# Patient Record
Sex: FEMALE | Race: BLACK OR AFRICAN AMERICAN | Employment: UNEMPLOYED | URBAN - METROPOLITAN AREA
[De-identification: names, ages, dates, MRNs, and addresses within clinical notes are randomized per-mention and may not be internally consistent; named-entity substitution may affect disease eponyms.]

---

## 2017-01-01 ENCOUNTER — APPOINTMENT (OUTPATIENT)
Dept: GENERAL RADIOLOGY | Age: 57
DRG: 023 | End: 2017-01-01
Attending: INTERNAL MEDICINE
Payer: SELF-PAY

## 2017-01-01 ENCOUNTER — APPOINTMENT (OUTPATIENT)
Dept: GENERAL RADIOLOGY | Age: 57
End: 2017-01-01
Attending: EMERGENCY MEDICINE
Payer: SELF-PAY

## 2017-01-01 ENCOUNTER — HOSPITAL ENCOUNTER (INPATIENT)
Age: 57
LOS: 3 days | DRG: 023 | End: 2017-07-03
Attending: EMERGENCY MEDICINE | Admitting: INTERNAL MEDICINE
Payer: SELF-PAY

## 2017-01-01 ENCOUNTER — APPOINTMENT (OUTPATIENT)
Dept: GENERAL RADIOLOGY | Age: 57
DRG: 023 | End: 2017-01-01
Attending: PSYCHIATRY & NEUROLOGY
Payer: SELF-PAY

## 2017-01-01 ENCOUNTER — APPOINTMENT (OUTPATIENT)
Dept: CT IMAGING | Age: 57
DRG: 023 | End: 2017-01-01
Attending: PSYCHIATRY & NEUROLOGY
Payer: SELF-PAY

## 2017-01-01 ENCOUNTER — ANESTHESIA EVENT (OUTPATIENT)
Dept: INTERVENTIONAL RADIOLOGY/VASCULAR | Age: 57
DRG: 023 | End: 2017-01-01
Payer: SELF-PAY

## 2017-01-01 ENCOUNTER — ANESTHESIA (OUTPATIENT)
Dept: INTERVENTIONAL RADIOLOGY/VASCULAR | Age: 57
DRG: 023 | End: 2017-01-01
Payer: SELF-PAY

## 2017-01-01 ENCOUNTER — TELEPHONE (OUTPATIENT)
Dept: NEUROLOGY | Age: 57
End: 2017-01-01

## 2017-01-01 ENCOUNTER — APPOINTMENT (OUTPATIENT)
Dept: CT IMAGING | Age: 57
End: 2017-01-01
Attending: EMERGENCY MEDICINE
Payer: SELF-PAY

## 2017-01-01 ENCOUNTER — APPOINTMENT (OUTPATIENT)
Dept: INTERVENTIONAL RADIOLOGY/VASCULAR | Age: 57
DRG: 023 | End: 2017-01-01
Attending: PSYCHIATRY & NEUROLOGY
Payer: SELF-PAY

## 2017-01-01 ENCOUNTER — HOSPITAL ENCOUNTER (EMERGENCY)
Age: 57
Discharge: ANOTHER HOSPITAL | End: 2017-06-30
Attending: EMERGENCY MEDICINE
Payer: SELF-PAY

## 2017-01-01 VITALS
HEIGHT: 65 IN | SYSTOLIC BLOOD PRESSURE: 97 MMHG | DIASTOLIC BLOOD PRESSURE: 69 MMHG | BODY MASS INDEX: 16.75 KG/M2 | WEIGHT: 100.53 LBS | HEART RATE: 81 BPM | OXYGEN SATURATION: 100 % | TEMPERATURE: 97.4 F

## 2017-01-01 VITALS
TEMPERATURE: 96.6 F | HEART RATE: 93 BPM | SYSTOLIC BLOOD PRESSURE: 207 MMHG | OXYGEN SATURATION: 100 % | DIASTOLIC BLOOD PRESSURE: 111 MMHG | RESPIRATION RATE: 18 BRPM

## 2017-01-01 DIAGNOSIS — I61.5 IVH (INTRAVENTRICULAR HEMORRHAGE) (HCC): Primary | ICD-10-CM

## 2017-01-01 DIAGNOSIS — G91.9 HYDROCEPHALUS (HCC): ICD-10-CM

## 2017-01-01 DIAGNOSIS — F14.10 COCAINE ABUSE (HCC): ICD-10-CM

## 2017-01-01 DIAGNOSIS — R56.9 SEIZURES (HCC): ICD-10-CM

## 2017-01-01 DIAGNOSIS — I61.0 THALAMIC HEMORRHAGE (HCC): Primary | ICD-10-CM

## 2017-01-01 DIAGNOSIS — G93.6 CEREBRAL EDEMA (HCC): ICD-10-CM

## 2017-01-01 DIAGNOSIS — I61.5 LEFT-SIDED NONTRAUMATIC INTRAVENTRICULAR INTRACEREBRAL HEMORRHAGE (HCC): ICD-10-CM

## 2017-01-01 DIAGNOSIS — E87.6 HYPOKALEMIA: ICD-10-CM

## 2017-01-01 DIAGNOSIS — R41.82 ALTERED MENTAL STATUS, UNSPECIFIED ALTERED MENTAL STATUS TYPE: ICD-10-CM

## 2017-01-01 LAB
ABO + RH BLD: NORMAL
ABO + RH BLD: NORMAL
ACETONE SERPL-MCNC: NEGATIVE % (ref 0–0.01)
ALBUMIN SERPL BCP-MCNC: 3 G/DL (ref 3.4–5)
ALBUMIN SERPL BCP-MCNC: 4.1 G/DL (ref 3.4–5)
ALBUMIN/GLOB SERPL: 0.8 {RATIO} (ref 0.8–1.7)
ALBUMIN/GLOB SERPL: 1 {RATIO} (ref 0.8–1.7)
ALP SERPL-CCNC: 133 U/L (ref 45–117)
ALP SERPL-CCNC: 85 U/L (ref 45–117)
ALT SERPL-CCNC: 25 U/L (ref 13–56)
ALT SERPL-CCNC: 39 U/L (ref 13–56)
AMMONIA PLAS-SCNC: 29 UMOL/L (ref 11–32)
AMPHET UR QL SCN: NEGATIVE
AMYLASE SERPL-CCNC: 108 U/L (ref 25–115)
ANION GAP BLD CALC-SCNC: 10 MMOL/L (ref 3–18)
ANION GAP BLD CALC-SCNC: 11 MMOL/L (ref 3–18)
ANION GAP BLD CALC-SCNC: 9 MMOL/L (ref 3–18)
APPEARANCE UR: CLEAR
APTT PPP: 31.6 SEC (ref 23–36.4)
APTT PPP: 35.8 SEC (ref 23–36.4)
APTT PPP: 36.6 SEC (ref 23–36.4)
ARTERIAL PATENCY WRIST A: ABNORMAL
ASPIRIN TEST, ASPIRN: 557 ARU
AST SERPL W P-5'-P-CCNC: 22 U/L (ref 15–37)
AST SERPL W P-5'-P-CCNC: 44 U/L (ref 15–37)
ATRIAL RATE: 73 BPM
ATRIAL RATE: 83 BPM
ATRIAL RATE: 84 BPM
BACTERIA SPEC CULT: NORMAL
BACTERIA URNS QL MICRO: NEGATIVE /HPF
BARBITURATES UR QL SCN: NEGATIVE
BASE DEFICIT BLD-SCNC: 2 MMOL/L
BASE EXCESS BLD CALC-SCNC: 1 MMOL/L
BASE EXCESS BLD CALC-SCNC: 4 MMOL/L
BASE EXCESS BLD CALC-SCNC: 6 MMOL/L
BASOPHILS # BLD AUTO: 0 K/UL (ref 0–0.1)
BASOPHILS # BLD: 0 % (ref 0–2)
BDY SITE: ABNORMAL
BENZODIAZ UR QL: NEGATIVE
BILIRUB DIRECT SERPL-MCNC: 0.1 MG/DL (ref 0–0.2)
BILIRUB SERPL-MCNC: 0.5 MG/DL (ref 0.2–1)
BILIRUB SERPL-MCNC: 0.5 MG/DL (ref 0.2–1)
BILIRUB UR QL: NEGATIVE
BLOOD GROUP ANTIBODIES SERPL: NORMAL
BLOOD GROUP ANTIBODIES SERPL: NORMAL
BODY TEMPERATURE: 32.9
BODY TEMPERATURE: 36
BODY TEMPERATURE: 36.4
BODY TEMPERATURE: 97.6
BUN SERPL-MCNC: 24 MG/DL (ref 7–18)
BUN SERPL-MCNC: 25 MG/DL (ref 7–18)
BUN SERPL-MCNC: 25 MG/DL (ref 7–18)
BUN SERPL-MCNC: 39 MG/DL (ref 7–18)
BUN SERPL-MCNC: 46 MG/DL (ref 7–18)
BUN/CREAT SERPL: 18 (ref 12–20)
BUN/CREAT SERPL: 19 (ref 12–20)
BUN/CREAT SERPL: 19 (ref 12–20)
BUN/CREAT SERPL: 20 (ref 12–20)
BUN/CREAT SERPL: 22 (ref 12–20)
CA-I SERPL-SCNC: 0.98 MMOL/L (ref 1.12–1.32)
CA-I SERPL-SCNC: 1.1 MMOL/L (ref 1.12–1.32)
CA-I SERPL-SCNC: 1.15 MMOL/L (ref 1.12–1.32)
CALCIUM SERPL-MCNC: 7.9 MG/DL (ref 8.5–10.1)
CALCIUM SERPL-MCNC: 8.4 MG/DL (ref 8.5–10.1)
CALCIUM SERPL-MCNC: 8.8 MG/DL (ref 8.5–10.1)
CALCIUM SERPL-MCNC: 9 MG/DL (ref 8.5–10.1)
CALCIUM SERPL-MCNC: 9.2 MG/DL (ref 8.5–10.1)
CALCULATED P AXIS, ECG09: 36 DEGREES
CALCULATED P AXIS, ECG09: 72 DEGREES
CALCULATED P AXIS, ECG09: 78 DEGREES
CALCULATED R AXIS, ECG10: 64 DEGREES
CALCULATED R AXIS, ECG10: 70 DEGREES
CALCULATED R AXIS, ECG10: 78 DEGREES
CALCULATED T AXIS, ECG11: 171 DEGREES
CALCULATED T AXIS, ECG11: 87 DEGREES
CALCULATED T AXIS, ECG11: 98 DEGREES
CANNABINOIDS UR QL SCN: NEGATIVE
CHARACTER SMN: ABNORMAL
CHARACTER SMN: ABNORMAL
CHARACTER SMN: CLEAR
CHLORIDE SERPL-SCNC: 104 MMOL/L (ref 100–108)
CHLORIDE SERPL-SCNC: 116 MMOL/L (ref 100–108)
CHLORIDE SERPL-SCNC: 130 MMOL/L (ref 100–108)
CHLORIDE SERPL-SCNC: 92 MMOL/L (ref 100–108)
CHLORIDE SERPL-SCNC: 98 MMOL/L (ref 100–108)
CHOLEST SERPL-MCNC: 286 MG/DL
CK MB CFR SERPL CALC: 3.3 % (ref 0–4)
CK MB CFR SERPL CALC: 4.4 % (ref 0–4)
CK MB CFR SERPL CALC: 4.5 % (ref 0–4)
CK MB CFR SERPL CALC: 5.3 % (ref 0–4)
CK MB CFR SERPL CALC: 5.8 % (ref 0–4)
CK MB CFR SERPL CALC: 5.9 % (ref 0–4)
CK MB CFR SERPL CALC: 7.1 % (ref 0–4)
CK MB CFR SERPL CALC: 7.4 % (ref 0–4)
CK MB CFR SERPL CALC: 7.7 % (ref 0–4)
CK MB SERPL-MCNC: 10.4 NG/ML (ref 5–25)
CK MB SERPL-MCNC: 10.7 NG/ML (ref 5–25)
CK MB SERPL-MCNC: 11 NG/ML (ref 5–25)
CK MB SERPL-MCNC: 18.7 NG/ML (ref 5–25)
CK MB SERPL-MCNC: 24.6 NG/ML (ref 5–25)
CK MB SERPL-MCNC: 4 NG/ML (ref 5–25)
CK MB SERPL-MCNC: 4.8 NG/ML (ref 5–25)
CK MB SERPL-MCNC: 4.8 NG/ML (ref 5–25)
CK MB SERPL-MCNC: 7 NG/ML (ref 5–25)
CK SERPL-CCNC: 106 U/L (ref 26–192)
CK SERPL-CCNC: 119 U/L (ref 26–192)
CK SERPL-CCNC: 145 U/L (ref 26–192)
CK SERPL-CCNC: 151 U/L (ref 26–192)
CK SERPL-CCNC: 178 U/L (ref 26–192)
CK SERPL-CCNC: 206 U/L (ref 26–192)
CK SERPL-CCNC: 253 U/L (ref 26–192)
CK SERPL-CCNC: 318 U/L (ref 26–192)
CK SERPL-CCNC: 91 U/L (ref 26–192)
CO2 SERPL-SCNC: 21 MMOL/L (ref 21–32)
CO2 SERPL-SCNC: 23 MMOL/L (ref 21–32)
CO2 SERPL-SCNC: 26 MMOL/L (ref 21–32)
CO2 SERPL-SCNC: 26 MMOL/L (ref 21–32)
CO2 SERPL-SCNC: 28 MMOL/L (ref 21–32)
COCAINE UR QL SCN: POSITIVE
COLOR SPUN CSF: ABNORMAL
COLOR SPUN CSF: COLORLESS
COLOR SPUN CSF: COLORLESS
COLOR SPUN CSF: YELLOW
COLOR UR: YELLOW
CORTIS SERPL-MCNC: 20.5 UG/DL (ref 3.09–22.4)
CREAT SERPL-MCNC: 1.28 MG/DL (ref 0.6–1.3)
CREAT SERPL-MCNC: 1.31 MG/DL (ref 0.6–1.3)
CREAT SERPL-MCNC: 1.35 MG/DL (ref 0.6–1.3)
CREAT SERPL-MCNC: 1.74 MG/DL (ref 0.6–1.3)
CREAT SERPL-MCNC: 2.37 MG/DL (ref 0.6–1.3)
CRP SERPL HS-MCNC: 1.38 MG/L (ref 0–3)
DIAGNOSIS, 93000: NORMAL
DIFFERENTIAL METHOD BLD: ABNORMAL
EOSINOPHIL # BLD: 0.1 K/UL (ref 0–0.4)
EOSINOPHIL NFR BLD: 1 % (ref 0–5)
EOSINOPHIL NFR CSF MANUAL: 1 %
EPITH CASTS URNS QL MICRO: NEGATIVE /LPF (ref 0–5)
ERYTHROCYTE [DISTWIDTH] IN BLOOD BY AUTOMATED COUNT: 14.4 % (ref 11.6–14.5)
ERYTHROCYTE [DISTWIDTH] IN BLOOD BY AUTOMATED COUNT: 14.7 % (ref 11.6–14.5)
ERYTHROCYTE [DISTWIDTH] IN BLOOD BY AUTOMATED COUNT: 15 % (ref 11.6–14.5)
ERYTHROCYTE [DISTWIDTH] IN BLOOD BY AUTOMATED COUNT: 15.1 % (ref 11.6–14.5)
ERYTHROCYTE [SEDIMENTATION RATE] IN BLOOD: 10 MM/HR (ref 0–30)
EST. AVERAGE GLUCOSE BLD GHB EST-MCNC: 117 MG/DL
ETHANOL BLD GC-MCNC: NEGATIVE % (ref 0–0.01)
GAS FLOW.O2 O2 DELIVERY SYS: ABNORMAL L/MIN
GAS FLOW.O2 SETTING OXYMISER: 10 BPM
GAS FLOW.O2 SETTING OXYMISER: 10 BPM
GAS FLOW.O2 SETTING OXYMISER: 16 BPM
GAS FLOW.O2 SETTING OXYMISER: 18 BPM
GLOBULIN SER CALC-MCNC: 3.6 G/DL (ref 2–4)
GLOBULIN SER CALC-MCNC: 4.2 G/DL (ref 2–4)
GLUCOSE BLD STRIP.AUTO-MCNC: 121 MG/DL (ref 70–110)
GLUCOSE BLD STRIP.AUTO-MCNC: 121 MG/DL (ref 70–110)
GLUCOSE BLD STRIP.AUTO-MCNC: 127 MG/DL (ref 70–110)
GLUCOSE BLD STRIP.AUTO-MCNC: 133 MG/DL (ref 70–110)
GLUCOSE BLD STRIP.AUTO-MCNC: 135 MG/DL (ref 70–110)
GLUCOSE BLD STRIP.AUTO-MCNC: 136 MG/DL (ref 70–110)
GLUCOSE BLD STRIP.AUTO-MCNC: 142 MG/DL (ref 70–110)
GLUCOSE BLD STRIP.AUTO-MCNC: 157 MG/DL (ref 70–110)
GLUCOSE BLD STRIP.AUTO-MCNC: 172 MG/DL (ref 70–110)
GLUCOSE BLD STRIP.AUTO-MCNC: 175 MG/DL (ref 70–110)
GLUCOSE BLD STRIP.AUTO-MCNC: 187 MG/DL (ref 70–110)
GLUCOSE CSF-MCNC: 23 MG/DL (ref 40–70)
GLUCOSE CSF-MCNC: 77 MG/DL (ref 40–70)
GLUCOSE CSF-MCNC: 81 MG/DL (ref 40–70)
GLUCOSE SERPL-MCNC: 141 MG/DL (ref 74–99)
GLUCOSE SERPL-MCNC: 153 MG/DL (ref 74–99)
GLUCOSE SERPL-MCNC: 168 MG/DL (ref 74–99)
GLUCOSE SERPL-MCNC: 219 MG/DL (ref 74–99)
GLUCOSE SERPL-MCNC: 225 MG/DL (ref 74–99)
GLUCOSE UR STRIP.AUTO-MCNC: NEGATIVE MG/DL
HBA1C MFR BLD: 5.7 % (ref 4.2–5.6)
HCO3 BLD-SCNC: 21.7 MMOL/L (ref 22–26)
HCO3 BLD-SCNC: 25.7 MMOL/L (ref 22–26)
HCO3 BLD-SCNC: 30.1 MMOL/L (ref 22–26)
HCO3 BLD-SCNC: 32.1 MMOL/L (ref 22–26)
HCT VFR BLD AUTO: 35.7 % (ref 35–45)
HCT VFR BLD AUTO: 35.8 % (ref 35–45)
HCT VFR BLD AUTO: 41 % (ref 35–45)
HCT VFR BLD AUTO: 42.2 % (ref 35–45)
HDLC SERPL-MCNC: 87 MG/DL (ref 40–60)
HDLC SERPL: 3.3 {RATIO} (ref 0–5)
HDSCOM,HDSCOM: ABNORMAL
HGB BLD-MCNC: 12 G/DL (ref 12–16)
HGB BLD-MCNC: 12.2 G/DL (ref 12–16)
HGB BLD-MCNC: 14.3 G/DL (ref 12–16)
HGB BLD-MCNC: 14.6 G/DL (ref 12–16)
HGB UR QL STRIP: ABNORMAL
INR PPP: 0.9 (ref 0.8–1.2)
INR PPP: 0.9 (ref 0.8–1.2)
INR PPP: 1.1 (ref 0.8–1.2)
INR PPP: 1.2 (ref 0.8–1.2)
INSPIRATION.DURATION SETTING TIME VENT: 0.9 SEC
INSPIRATION.DURATION SETTING TIME VENT: 1 SEC
ISOPROPANOL SERPL-MCNC: NEGATIVE % (ref 0–0.01)
KETONES UR QL STRIP.AUTO: NEGATIVE MG/DL
LACTATE SERPL-SCNC: 1.5 MMOL/L (ref 0.4–2)
LACTATE SERPL-SCNC: 1.5 MMOL/L (ref 0.4–2)
LACTATE SERPL-SCNC: 2 MMOL/L (ref 0.4–2)
LACTATE SERPL-SCNC: 2.2 MMOL/L (ref 0.4–2)
LACTATE SERPL-SCNC: 2.7 MMOL/L (ref 0.4–2)
LACTATE SERPL-SCNC: 3.3 MMOL/L (ref 0.4–2)
LDLC SERPL CALC-MCNC: 180.8 MG/DL (ref 0–100)
LEUKOCYTE ESTERASE UR QL STRIP.AUTO: NEGATIVE
LIPASE SERPL-CCNC: 364 U/L (ref 73–393)
LIPID PROFILE,FLP: ABNORMAL
LYMPHOCYTES # BLD AUTO: 15 % (ref 21–52)
LYMPHOCYTES # BLD: 2 K/UL (ref 0.9–3.6)
LYMPHOCYTES NFR CSF MANUAL: 32 %
MAGNESIUM SERPL-MCNC: 1.8 MG/DL (ref 1.6–2.6)
MAGNESIUM SERPL-MCNC: 2.1 MG/DL (ref 1.6–2.6)
MAGNESIUM SERPL-MCNC: 2.6 MG/DL (ref 1.6–2.6)
MAGNESIUM SERPL-MCNC: 2.7 MG/DL (ref 1.6–2.6)
MAGNESIUM SERPL-MCNC: 2.7 MG/DL (ref 1.6–2.6)
MCH RBC QN AUTO: 25.2 PG (ref 24–34)
MCH RBC QN AUTO: 25.6 PG (ref 24–34)
MCH RBC QN AUTO: 25.7 PG (ref 24–34)
MCH RBC QN AUTO: 25.9 PG (ref 24–34)
MCHC RBC AUTO-ENTMCNC: 33.6 G/DL (ref 31–37)
MCHC RBC AUTO-ENTMCNC: 34.1 G/DL (ref 31–37)
MCHC RBC AUTO-ENTMCNC: 34.6 G/DL (ref 31–37)
MCHC RBC AUTO-ENTMCNC: 34.9 G/DL (ref 31–37)
MCV RBC AUTO: 74.1 FL (ref 74–97)
MCV RBC AUTO: 74.2 FL (ref 74–97)
MCV RBC AUTO: 75 FL (ref 74–97)
MCV RBC AUTO: 75.2 FL (ref 74–97)
METHADONE UR QL: NEGATIVE
METHANOL SERPL-MCNC: NEGATIVE % (ref 0–0.01)
MONOCYTES # BLD: 0.5 K/UL (ref 0.05–1.2)
MONOCYTES NFR BLD AUTO: 4 % (ref 3–10)
MONOCYTES NFR CSF MANUAL: 6 %
NEUTS SEG # BLD: 11 K/UL (ref 1.8–8)
NEUTS SEG NFR BLD AUTO: 80 % (ref 40–73)
NEUTS SEG NFR CSF MANUAL: 61 % (ref 0–6)
NITRIC:PPM ISTAT,INITR: 0 PPM
NITRITE UR QL STRIP.AUTO: NEGATIVE
O2/TOTAL GAS SETTING VFR VENT: 30 %
O2/TOTAL GAS SETTING VFR VENT: 30 %
O2/TOTAL GAS SETTING VFR VENT: 60 %
O2/TOTAL GAS SETTING VFR VENT: 70 %
OPIATES UR QL: NEGATIVE
OSMOLALITY SERPL: 330 MOSM/KG H2O (ref 280–300)
OSMOLALITY SERPL: 336 MOSM/KG H2O (ref 280–300)
OSMOLALITY SERPL: 348 MOSM/KG H2O (ref 280–300)
OSMOLALITY SERPL: 348 MOSM/KG H2O (ref 280–300)
OSMOLALITY SERPL: 357 MOSM/KG H2O (ref 280–300)
P-R INTERVAL, ECG05: 152 MS
P-R INTERVAL, ECG05: 158 MS
P-R INTERVAL, ECG05: 166 MS
PCO2 BLD: 28.9 MMHG (ref 35–45)
PCO2 BLD: 37.3 MMHG (ref 35–45)
PCO2 BLD: 39.9 MMHG (ref 35–45)
PCO2 BLD: 57 MMHG (ref 35–45)
PCP UR QL: NEGATIVE
PEEP RESPIRATORY: 5 CMH2O
PEEP RESPIRATORY: 6 CMH2O
PH BLD: 7.36 [PH] (ref 7.35–7.45)
PH BLD: 7.41 [PH] (ref 7.35–7.45)
PH BLD: 7.48 [PH] (ref 7.35–7.45)
PH BLD: 7.5 [PH] (ref 7.35–7.45)
PH UR STRIP: 8.5 [PH] (ref 5–8)
PHOSPHATE SERPL-MCNC: 2.5 MG/DL (ref 2.5–4.9)
PHOSPHATE SERPL-MCNC: 2.8 MG/DL (ref 2.5–4.9)
PHOSPHATE SERPL-MCNC: 3 MG/DL (ref 2.5–4.9)
PHOSPHATE SERPL-MCNC: 3.7 MG/DL (ref 2.5–4.9)
PIP ISTAT,IPIP: 21
PLATELET # BLD AUTO: 231 K/UL (ref 135–420)
PLATELET # BLD AUTO: 235 K/UL (ref 135–420)
PLATELET # BLD AUTO: 261 K/UL (ref 135–420)
PLATELET # BLD AUTO: 268 K/UL (ref 135–420)
PLATELET COMMENTS,PCOM: ABNORMAL
PMV BLD AUTO: 10.8 FL (ref 9.2–11.8)
PMV BLD AUTO: 10.8 FL (ref 9.2–11.8)
PMV BLD AUTO: 11.1 FL (ref 9.2–11.8)
PMV BLD AUTO: 11.5 FL (ref 9.2–11.8)
PO2 BLD: 155 MMHG (ref 80–100)
PO2 BLD: 159 MMHG (ref 80–100)
PO2 BLD: 262 MMHG (ref 80–100)
PO2 BLD: 353 MMHG (ref 80–100)
POTASSIUM SERPL-SCNC: 2.5 MMOL/L (ref 3.5–5.5)
POTASSIUM SERPL-SCNC: 2.6 MMOL/L (ref 3.5–5.5)
POTASSIUM SERPL-SCNC: 3.8 MMOL/L (ref 3.5–5.5)
POTASSIUM SERPL-SCNC: 4 MMOL/L (ref 3.5–5.5)
POTASSIUM SERPL-SCNC: 4.1 MMOL/L (ref 3.5–5.5)
POTASSIUM SERPL-SCNC: 4.2 MMOL/L (ref 3.5–5.5)
POTASSIUM SERPL-SCNC: 4.8 MMOL/L (ref 3.5–5.5)
PRESSURE SUPPORT SETTING VENT: 10 CMH2O
PRESSURE SUPPORT SETTING VENT: 10 CMH2O
PROT CSF-MCNC: 248 MG/DL (ref 15–45)
PROT CSF-MCNC: 320 MG/DL (ref 15–45)
PROT CSF-MCNC: 585 MG/DL (ref 15–45)
PROT SERPL-MCNC: 6.6 G/DL (ref 6.4–8.2)
PROT SERPL-MCNC: 8.3 G/DL (ref 6.4–8.2)
PROT UR STRIP-MCNC: NEGATIVE MG/DL
PROTHROMBIN TIME: 11.6 SEC (ref 11.5–15.2)
PROTHROMBIN TIME: 12.1 SEC (ref 11.5–15.2)
PROTHROMBIN TIME: 13.7 SEC (ref 11.5–15.2)
PROTHROMBIN TIME: 14.3 SEC (ref 11.5–15.2)
Q-T INTERVAL, ECG07: 454 MS
Q-T INTERVAL, ECG07: 506 MS
Q-T INTERVAL, ECG07: 572 MS
QRS DURATION, ECG06: 102 MS
QRS DURATION, ECG06: 88 MS
QRS DURATION, ECG06: 98 MS
QTC CALCULATION (BEZET), ECG08: 536 MS
QTC CALCULATION (BEZET), ECG08: 594 MS
QTC CALCULATION (BEZET), ECG08: 630 MS
RBC # BLD AUTO: 4.76 M/UL (ref 4.2–5.3)
RBC # BLD AUTO: 4.76 M/UL (ref 4.2–5.3)
RBC # BLD AUTO: 5.53 M/UL (ref 4.2–5.3)
RBC # BLD AUTO: 5.69 M/UL (ref 4.2–5.3)
RBC # CSF: 130 /CU MM
RBC # CSF: 3600 /CU MM
RBC # CSF: ABNORMAL /CU MM
RBC #/AREA URNS HPF: NORMAL /HPF (ref 0–5)
RBC MORPH BLD: ABNORMAL
SAO2 % BLD: 100 % (ref 92–97)
SAO2 % BLD: 99 % (ref 92–97)
SERVICE CMNT-IMP: ABNORMAL
SERVICE CMNT-IMP: NORMAL
SODIUM SERPL-SCNC: 129 MMOL/L (ref 136–145)
SODIUM SERPL-SCNC: 137 MMOL/L (ref 136–145)
SODIUM SERPL-SCNC: 141 MMOL/L (ref 136–145)
SODIUM SERPL-SCNC: 149 MMOL/L (ref 136–145)
SODIUM SERPL-SCNC: 150 MMOL/L (ref 136–145)
SODIUM SERPL-SCNC: 156 MMOL/L (ref 136–145)
SODIUM SERPL-SCNC: 159 MMOL/L (ref 136–145)
SODIUM SERPL-SCNC: 160 MMOL/L (ref 136–145)
SODIUM SERPL-SCNC: 163 MMOL/L (ref 136–145)
SP GR UR REFRACTOMETRY: 1.02 (ref 1–1.03)
SPECIMEN EXP DATE BLD: NORMAL
SPECIMEN EXP DATE BLD: NORMAL
SPECIMEN TYPE: ABNORMAL
TOTAL RESP. RATE, ITRR: 10
TOTAL RESP. RATE, ITRR: 10
TOTAL RESP. RATE, ITRR: 16
TOTAL RESP. RATE, ITRR: 18
TRIGL SERPL-MCNC: 91 MG/DL (ref ?–150)
TROPONIN I SERPL-MCNC: 0.89 NG/ML (ref 0–0.04)
TROPONIN I SERPL-MCNC: 16.1 NG/ML (ref 0–0.04)
TROPONIN I SERPL-MCNC: 16.8 NG/ML (ref 0–0.04)
TROPONIN I SERPL-MCNC: 19 NG/ML (ref 0–0.04)
TROPONIN I SERPL-MCNC: 19.2 NG/ML (ref 0–0.04)
TROPONIN I SERPL-MCNC: 21.9 NG/ML (ref 0–0.04)
TROPONIN I SERPL-MCNC: 22.6 NG/ML (ref 0–0.04)
TROPONIN I SERPL-MCNC: 23.6 NG/ML (ref 0–0.04)
TROPONIN I SERPL-MCNC: 4.37 NG/ML (ref 0–0.04)
TSH SERPL DL<=0.05 MIU/L-ACNC: 0.32 UIU/ML (ref 0.36–3.74)
TUBE # CSF: 1
TUBE # CSF: ABNORMAL
UROBILINOGEN UR QL STRIP.AUTO: 0.2 EU/DL (ref 0.2–1)
VENTILATION MODE VENT: ABNORMAL
VENTRICULAR RATE, ECG03: 73 BPM
VENTRICULAR RATE, ECG03: 83 BPM
VENTRICULAR RATE, ECG03: 84 BPM
VLDLC SERPL CALC-MCNC: 18.2 MG/DL
VOLUME CONTROL PLUS IVLCP: YES
VT SETTING VENT: 450 ML
VT SETTING VENT: 500 ML
WBC # BLD AUTO: 13.6 K/UL (ref 4.6–13.2)
WBC # BLD AUTO: 13.7 K/UL (ref 4.6–13.2)
WBC # BLD AUTO: 13.7 K/UL (ref 4.6–13.2)
WBC # BLD AUTO: 14.6 K/UL (ref 4.6–13.2)
WBC # CSF: 1 /CU MM
WBC # CSF: 100 /CU MM
WBC # CSF: 4 /CU MM
WBC URNS QL MICRO: NORMAL /HPF (ref 0–4)

## 2017-01-01 PROCEDURE — 74011250636 HC RX REV CODE- 250/636: Performed by: PSYCHIATRY & NEUROLOGY

## 2017-01-01 PROCEDURE — 82803 BLOOD GASES ANY COMBINATION: CPT

## 2017-01-01 PROCEDURE — 36556 INSERT NON-TUNNEL CV CATH: CPT

## 2017-01-01 PROCEDURE — 93005 ELECTROCARDIOGRAM TRACING: CPT

## 2017-01-01 PROCEDURE — 94002 VENT MGMT INPAT INIT DAY: CPT

## 2017-01-01 PROCEDURE — 85610 PROTHROMBIN TIME: CPT | Performed by: PSYCHIATRY & NEUROLOGY

## 2017-01-01 PROCEDURE — 96375 TX/PRO/DX INJ NEW DRUG ADDON: CPT

## 2017-01-01 PROCEDURE — C1751 CATH, INF, PER/CENT/MIDLINE: HCPCS

## 2017-01-01 PROCEDURE — 74011000258 HC RX REV CODE- 258: Performed by: EMERGENCY MEDICINE

## 2017-01-01 PROCEDURE — 83605 ASSAY OF LACTIC ACID: CPT | Performed by: INTERNAL MEDICINE

## 2017-01-01 PROCEDURE — 94003 VENT MGMT INPAT SUBQ DAY: CPT

## 2017-01-01 PROCEDURE — 82945 GLUCOSE OTHER FLUID: CPT | Performed by: PSYCHIATRY & NEUROLOGY

## 2017-01-01 PROCEDURE — 85652 RBC SED RATE AUTOMATED: CPT | Performed by: PSYCHIATRY & NEUROLOGY

## 2017-01-01 PROCEDURE — 76060000035 HC ANESTHESIA 2 TO 2.5 HR

## 2017-01-01 PROCEDURE — 89050 BODY FLUID CELL COUNT: CPT | Performed by: PSYCHIATRY & NEUROLOGY

## 2017-01-01 PROCEDURE — 77030020186 HC BOOT HL PROTCT SAGE -B

## 2017-01-01 PROCEDURE — 74011250637 HC RX REV CODE- 250/637: Performed by: PSYCHIATRY & NEUROLOGY

## 2017-01-01 PROCEDURE — 74011258636 HC RX REV CODE- 258/636: Performed by: INTERNAL MEDICINE

## 2017-01-01 PROCEDURE — 36600 WITHDRAWAL OF ARTERIAL BLOOD: CPT

## 2017-01-01 PROCEDURE — 74011000250 HC RX REV CODE- 250: Performed by: PSYCHIATRY & NEUROLOGY

## 2017-01-01 PROCEDURE — 74011000250 HC RX REV CODE- 250: Performed by: INTERNAL MEDICINE

## 2017-01-01 PROCEDURE — 82330 ASSAY OF CALCIUM: CPT | Performed by: PSYCHIATRY & NEUROLOGY

## 2017-01-01 PROCEDURE — 80307 DRUG TEST PRSMV CHEM ANLYZR: CPT | Performed by: EMERGENCY MEDICINE

## 2017-01-01 PROCEDURE — 82140 ASSAY OF AMMONIA: CPT | Performed by: PSYCHIATRY & NEUROLOGY

## 2017-01-01 PROCEDURE — 74011250636 HC RX REV CODE- 250/636: Performed by: INTERNAL MEDICINE

## 2017-01-01 PROCEDURE — 87070 CULTURE OTHR SPECIMN AEROBIC: CPT | Performed by: PSYCHIATRY & NEUROLOGY

## 2017-01-01 PROCEDURE — 0BH17EZ INSERTION OF ENDOTRACHEAL AIRWAY INTO TRACHEA, VIA NATURAL OR ARTIFICIAL OPENING: ICD-10-PCS | Performed by: INTERNAL MEDICINE

## 2017-01-01 PROCEDURE — 93306 TTE W/DOPPLER COMPLETE: CPT

## 2017-01-01 PROCEDURE — 85025 COMPLETE CBC W/AUTO DIFF WBC: CPT | Performed by: EMERGENCY MEDICINE

## 2017-01-01 PROCEDURE — 82550 ASSAY OF CK (CPK): CPT | Performed by: PSYCHIATRY & NEUROLOGY

## 2017-01-01 PROCEDURE — 77030008771 HC TU NG SALEM SUMP -A

## 2017-01-01 PROCEDURE — 74011000250 HC RX REV CODE- 250

## 2017-01-01 PROCEDURE — 70450 CT HEAD/BRAIN W/O DYE: CPT

## 2017-01-01 PROCEDURE — 96365 THER/PROPH/DIAG IV INF INIT: CPT

## 2017-01-01 PROCEDURE — 77030033263 HC DRSG MEPILEX 16-48IN BORD MOLN -B

## 2017-01-01 PROCEDURE — 74011000258 HC RX REV CODE- 258: Performed by: INTERNAL MEDICINE

## 2017-01-01 PROCEDURE — 74011000250 HC RX REV CODE- 250: Performed by: EMERGENCY MEDICINE

## 2017-01-01 PROCEDURE — 74011636320 HC RX REV CODE- 636/320: Performed by: INTERNAL MEDICINE

## 2017-01-01 PROCEDURE — 84132 ASSAY OF SERUM POTASSIUM: CPT | Performed by: PSYCHIATRY & NEUROLOGY

## 2017-01-01 PROCEDURE — 77030013797 HC KT TRNSDUC PRSSR EDWD -A: Performed by: ANESTHESIOLOGY

## 2017-01-01 PROCEDURE — 84157 ASSAY OF PROTEIN OTHER: CPT | Performed by: PSYCHIATRY & NEUROLOGY

## 2017-01-01 PROCEDURE — 84100 ASSAY OF PHOSPHORUS: CPT | Performed by: PSYCHIATRY & NEUROLOGY

## 2017-01-01 PROCEDURE — 75810000275 HC EMERGENCY DEPT VISIT NO LEVEL OF CARE

## 2017-01-01 PROCEDURE — 77030008683 HC TU ET CUF COVD -A

## 2017-01-01 PROCEDURE — 77030020245 HC SOL INJ 5% D/0.9%NACL

## 2017-01-01 PROCEDURE — 84295 ASSAY OF SERUM SODIUM: CPT | Performed by: PSYCHIATRY & NEUROLOGY

## 2017-01-01 PROCEDURE — 71010 XR CHEST PORT: CPT

## 2017-01-01 PROCEDURE — 74011636637 HC RX REV CODE- 636/637: Performed by: INTERNAL MEDICINE

## 2017-01-01 PROCEDURE — 83036 HEMOGLOBIN GLYCOSYLATED A1C: CPT | Performed by: PSYCHIATRY & NEUROLOGY

## 2017-01-01 PROCEDURE — 77030020250 HC SOL INJ D 5% LFCR 1000ML BG LF

## 2017-01-01 PROCEDURE — 74011250636 HC RX REV CODE- 250/636

## 2017-01-01 PROCEDURE — 65610000009 HC RM ICU NEURO

## 2017-01-01 PROCEDURE — 5A1955Z RESPIRATORY VENTILATION, GREATER THAN 96 CONSECUTIVE HOURS: ICD-10-PCS | Performed by: INTERNAL MEDICINE

## 2017-01-01 PROCEDURE — 84443 ASSAY THYROID STIM HORMONE: CPT | Performed by: PHYSICIAN ASSISTANT

## 2017-01-01 PROCEDURE — 83930 ASSAY OF BLOOD OSMOLALITY: CPT | Performed by: INTERNAL MEDICINE

## 2017-01-01 PROCEDURE — 70496 CT ANGIOGRAPHY HEAD: CPT

## 2017-01-01 PROCEDURE — 85610 PROTHROMBIN TIME: CPT | Performed by: EMERGENCY MEDICINE

## 2017-01-01 PROCEDURE — 86900 BLOOD TYPING SEROLOGIC ABO: CPT | Performed by: PSYCHIATRY & NEUROLOGY

## 2017-01-01 PROCEDURE — 74011000258 HC RX REV CODE- 258: Performed by: PHYSICIAN ASSISTANT

## 2017-01-01 PROCEDURE — 36569 INSJ PICC 5 YR+ W/O IMAGING: CPT | Performed by: PSYCHIATRY & NEUROLOGY

## 2017-01-01 PROCEDURE — 97161 PT EVAL LOW COMPLEX 20 MIN: CPT

## 2017-01-01 PROCEDURE — 80048 BASIC METABOLIC PNL TOTAL CA: CPT | Performed by: PSYCHIATRY & NEUROLOGY

## 2017-01-01 PROCEDURE — 84295 ASSAY OF SERUM SODIUM: CPT | Performed by: INTERNAL MEDICINE

## 2017-01-01 PROCEDURE — 87086 URINE CULTURE/COLONY COUNT: CPT | Performed by: PSYCHIATRY & NEUROLOGY

## 2017-01-01 PROCEDURE — 74011000258 HC RX REV CODE- 258: Performed by: PSYCHIATRY & NEUROLOGY

## 2017-01-01 PROCEDURE — 82962 GLUCOSE BLOOD TEST: CPT

## 2017-01-01 PROCEDURE — 80076 HEPATIC FUNCTION PANEL: CPT | Performed by: PSYCHIATRY & NEUROLOGY

## 2017-01-01 PROCEDURE — 83735 ASSAY OF MAGNESIUM: CPT | Performed by: EMERGENCY MEDICINE

## 2017-01-01 PROCEDURE — 74011000258 HC RX REV CODE- 258

## 2017-01-01 PROCEDURE — 02HV33Z INSERTION OF INFUSION DEVICE INTO SUPERIOR VENA CAVA, PERCUTANEOUS APPROACH: ICD-10-PCS | Performed by: RADIOLOGY

## 2017-01-01 PROCEDURE — 36592 COLLECT BLOOD FROM PICC: CPT

## 2017-01-01 PROCEDURE — 94400 HC END TIDAL CO2 RESPONSE CURVE: CPT

## 2017-01-01 PROCEDURE — 83605 ASSAY OF LACTIC ACID: CPT | Performed by: PHYSICIAN ASSISTANT

## 2017-01-01 PROCEDURE — 85730 THROMBOPLASTIN TIME PARTIAL: CPT | Performed by: PSYCHIATRY & NEUROLOGY

## 2017-01-01 PROCEDURE — 85027 COMPLETE CBC AUTOMATED: CPT | Performed by: PSYCHIATRY & NEUROLOGY

## 2017-01-01 PROCEDURE — 87040 BLOOD CULTURE FOR BACTERIA: CPT | Performed by: PSYCHIATRY & NEUROLOGY

## 2017-01-01 PROCEDURE — 80358 DRUG SCREENING METHADONE: CPT | Performed by: PSYCHIATRY & NEUROLOGY

## 2017-01-01 PROCEDURE — 77030032490 HC SLV COMPR SCD KNE COVD -B

## 2017-01-01 PROCEDURE — 83735 ASSAY OF MAGNESIUM: CPT | Performed by: PSYCHIATRY & NEUROLOGY

## 2017-01-01 PROCEDURE — 31500 INSERT EMERGENCY AIRWAY: CPT

## 2017-01-01 PROCEDURE — 96368 THER/DIAG CONCURRENT INF: CPT

## 2017-01-01 PROCEDURE — 82550 ASSAY OF CK (CPK): CPT | Performed by: PHYSICIAN ASSISTANT

## 2017-01-01 PROCEDURE — 83930 ASSAY OF BLOOD OSMOLALITY: CPT | Performed by: PSYCHIATRY & NEUROLOGY

## 2017-01-01 PROCEDURE — 82533 TOTAL CORTISOL: CPT | Performed by: PSYCHIATRY & NEUROLOGY

## 2017-01-01 PROCEDURE — 83605 ASSAY OF LACTIC ACID: CPT | Performed by: PSYCHIATRY & NEUROLOGY

## 2017-01-01 PROCEDURE — 74011250636 HC RX REV CODE- 250/636: Performed by: EMERGENCY MEDICINE

## 2017-01-01 PROCEDURE — 77030005514 HC CATH URETH FOL14 BARD -A

## 2017-01-01 PROCEDURE — 77030020263 HC SOL INJ SOD CL0.9% LFCR 1000ML

## 2017-01-01 PROCEDURE — 77030018846 HC SOL IRR STRL H20 ICUM -A

## 2017-01-01 PROCEDURE — 77030005401 HC CATH RAD ARRO -A: Performed by: ANESTHESIOLOGY

## 2017-01-01 PROCEDURE — 02HV33Z INSERTION OF INFUSION DEVICE INTO SUPERIOR VENA CAVA, PERCUTANEOUS APPROACH: ICD-10-PCS | Performed by: PSYCHIATRY & NEUROLOGY

## 2017-01-01 PROCEDURE — 77030018836 HC SOL IRR NACL ICUM -A

## 2017-01-01 PROCEDURE — 009630Z DRAINAGE OF CEREBRAL VENTRICLE WITH DRAINAGE DEVICE, PERCUTANEOUS APPROACH: ICD-10-PCS | Performed by: PSYCHIATRY & NEUROLOGY

## 2017-01-01 PROCEDURE — 85576 BLOOD PLATELET AGGREGATION: CPT | Performed by: PSYCHIATRY & NEUROLOGY

## 2017-01-01 PROCEDURE — 83690 ASSAY OF LIPASE: CPT | Performed by: PSYCHIATRY & NEUROLOGY

## 2017-01-01 PROCEDURE — 77030018719 HC DRSG PTCH ANTIMIC J&J -A

## 2017-01-01 PROCEDURE — 77030018786 HC NDL GD F/USND BARD -B

## 2017-01-01 PROCEDURE — 84132 ASSAY OF SERUM POTASSIUM: CPT | Performed by: INTERNAL MEDICINE

## 2017-01-01 PROCEDURE — 82150 ASSAY OF AMYLASE: CPT | Performed by: PSYCHIATRY & NEUROLOGY

## 2017-01-01 PROCEDURE — 97165 OT EVAL LOW COMPLEX 30 MIN: CPT

## 2017-01-01 PROCEDURE — 51702 INSERT TEMP BLADDER CATH: CPT

## 2017-01-01 PROCEDURE — 80053 COMPREHEN METABOLIC PANEL: CPT | Performed by: EMERGENCY MEDICINE

## 2017-01-01 PROCEDURE — 80061 LIPID PANEL: CPT | Performed by: PSYCHIATRY & NEUROLOGY

## 2017-01-01 PROCEDURE — 76937 US GUIDE VASCULAR ACCESS: CPT | Performed by: PSYCHIATRY & NEUROLOGY

## 2017-01-01 PROCEDURE — 77030030412

## 2017-01-01 PROCEDURE — 36415 COLL VENOUS BLD VENIPUNCTURE: CPT | Performed by: INTERNAL MEDICINE

## 2017-01-01 PROCEDURE — 86141 C-REACTIVE PROTEIN HS: CPT | Performed by: PSYCHIATRY & NEUROLOGY

## 2017-01-01 PROCEDURE — 77030020646 HC KT ACC VENTRC MEDT -D

## 2017-01-01 PROCEDURE — 80320 DRUG SCREEN QUANTALCOHOLS: CPT | Performed by: PSYCHIATRY & NEUROLOGY

## 2017-01-01 PROCEDURE — 74011000250 HC RX REV CODE- 250: Performed by: PHYSICIAN ASSISTANT

## 2017-01-01 PROCEDURE — 81001 URINALYSIS AUTO W/SCOPE: CPT | Performed by: PSYCHIATRY & NEUROLOGY

## 2017-01-01 PROCEDURE — 77030008768 HC TU NG VYGC -A

## 2017-01-01 PROCEDURE — 74011250636 HC RX REV CODE- 250/636: Performed by: PHYSICIAN ASSISTANT

## 2017-01-01 PROCEDURE — 99285 EMERGENCY DEPT VISIT HI MDM: CPT

## 2017-01-01 PROCEDURE — 77030011256 HC DRSG MEPILEX <16IN NO BORD MOLN -A

## 2017-01-01 RX ORDER — MAGNESIUM SULFATE 100 %
4 CRYSTALS MISCELLANEOUS AS NEEDED
Status: DISCONTINUED | OUTPATIENT
Start: 2017-01-01 | End: 2017-01-01

## 2017-01-01 RX ORDER — DEXTROSE MONOHYDRATE 50 MG/ML
150 INJECTION, SOLUTION INTRAVENOUS CONTINUOUS
Status: DISCONTINUED | OUTPATIENT
Start: 2017-01-01 | End: 2017-01-01 | Stop reason: HOSPADM

## 2017-01-01 RX ORDER — ONDANSETRON 2 MG/ML
4 INJECTION INTRAMUSCULAR; INTRAVENOUS
Status: DISCONTINUED | OUTPATIENT
Start: 2017-01-01 | End: 2017-01-01 | Stop reason: HOSPADM

## 2017-01-01 RX ORDER — MIDAZOLAM HYDROCHLORIDE 1 MG/ML
4 INJECTION, SOLUTION INTRAMUSCULAR; INTRAVENOUS ONCE
Status: COMPLETED | OUTPATIENT
Start: 2017-01-01 | End: 2017-01-01

## 2017-01-01 RX ORDER — ONDANSETRON 2 MG/ML
1 INJECTION INTRAMUSCULAR; INTRAVENOUS
Status: DISCONTINUED | OUTPATIENT
Start: 2017-01-01 | End: 2017-01-01

## 2017-01-01 RX ORDER — MAGNESIUM SULFATE 1 G/100ML
1 INJECTION INTRAVENOUS ONCE
Status: COMPLETED | OUTPATIENT
Start: 2017-01-01 | End: 2017-01-01

## 2017-01-01 RX ORDER — ACETAMINOPHEN 325 MG/1
650 TABLET ORAL
Status: DISCONTINUED | OUTPATIENT
Start: 2017-01-01 | End: 2017-01-01 | Stop reason: HOSPADM

## 2017-01-01 RX ORDER — NOREPINEPHRINE BITARTRATE/D5W 8 MG/250ML
0-20 PLASTIC BAG, INJECTION (ML) INTRAVENOUS
Status: DISCONTINUED | OUTPATIENT
Start: 2017-01-01 | End: 2017-01-01

## 2017-01-01 RX ORDER — PROPOFOL 10 MG/ML
50 VIAL (ML) INTRAVENOUS
Status: DISCONTINUED | OUTPATIENT
Start: 2017-01-01 | End: 2017-01-01

## 2017-01-01 RX ORDER — SODIUM CHLORIDE 9 MG/ML
500 INJECTION, SOLUTION INTRAVENOUS ONCE
Status: COMPLETED | OUTPATIENT
Start: 2017-01-01 | End: 2017-01-01

## 2017-01-01 RX ORDER — LORAZEPAM 2 MG/ML
1 INJECTION INTRAMUSCULAR
Status: DISCONTINUED | OUTPATIENT
Start: 2017-01-01 | End: 2017-01-01 | Stop reason: HOSPADM

## 2017-01-01 RX ORDER — DEXTROSE MONOHYDRATE AND SODIUM CHLORIDE 5; .9 G/100ML; G/100ML
100 INJECTION, SOLUTION INTRAVENOUS CONTINUOUS
Status: DISCONTINUED | OUTPATIENT
Start: 2017-01-01 | End: 2017-01-01

## 2017-01-01 RX ORDER — HYDRALAZINE HYDROCHLORIDE 20 MG/ML
10 INJECTION INTRAMUSCULAR; INTRAVENOUS ONCE
Status: DISCONTINUED | OUTPATIENT
Start: 2017-01-01 | End: 2017-01-01 | Stop reason: HOSPADM

## 2017-01-01 RX ORDER — SODIUM CHLORIDE 9 MG/ML
INJECTION, SOLUTION INTRAVENOUS
Status: DISCONTINUED | OUTPATIENT
Start: 2017-01-01 | End: 2017-01-01 | Stop reason: HOSPADM

## 2017-01-01 RX ORDER — SCOLOPAMINE TRANSDERMAL SYSTEM 1 MG/1
1.5 PATCH, EXTENDED RELEASE TRANSDERMAL
Status: DISCONTINUED | OUTPATIENT
Start: 2017-01-01 | End: 2017-01-01 | Stop reason: HOSPADM

## 2017-01-01 RX ORDER — SODIUM CHLORIDE 0.9 % (FLUSH) 0.9 %
10-40 SYRINGE (ML) INJECTION EVERY 8 HOURS
Status: DISCONTINUED | OUTPATIENT
Start: 2017-01-01 | End: 2017-01-01 | Stop reason: HOSPADM

## 2017-01-01 RX ORDER — MIDAZOLAM HYDROCHLORIDE 1 MG/ML
INJECTION, SOLUTION INTRAMUSCULAR; INTRAVENOUS
Status: COMPLETED
Start: 2017-01-01 | End: 2017-01-01

## 2017-01-01 RX ORDER — FAMOTIDINE 10 MG/ML
20 INJECTION INTRAVENOUS DAILY
Status: DISCONTINUED | OUTPATIENT
Start: 2017-01-01 | End: 2017-01-01

## 2017-01-01 RX ORDER — MORPHINE SULFATE 2 MG/ML
2 INJECTION, SOLUTION INTRAMUSCULAR; INTRAVENOUS
Status: DISCONTINUED | OUTPATIENT
Start: 2017-01-01 | End: 2017-01-01 | Stop reason: HOSPADM

## 2017-01-01 RX ORDER — POTASSIUM CHLORIDE, DEXTROSE MONOHYDRATE AND SODIUM CHLORIDE 300; 5; 900 MG/100ML; G/100ML; MG/100ML
INJECTION, SOLUTION INTRAVENOUS CONTINUOUS
Status: DISCONTINUED | OUTPATIENT
Start: 2017-01-01 | End: 2017-01-01

## 2017-01-01 RX ORDER — POTASSIUM CHLORIDE 29.8 MG/ML
20 INJECTION INTRAVENOUS
Status: COMPLETED | OUTPATIENT
Start: 2017-01-01 | End: 2017-01-01

## 2017-01-01 RX ORDER — PROPOFOL 10 MG/ML
VIAL (ML) INTRAVENOUS
Status: DISCONTINUED | OUTPATIENT
Start: 2017-01-01 | End: 2017-01-01 | Stop reason: HOSPADM

## 2017-01-01 RX ORDER — MORPHINE SULFATE 10 MG/ML
10 INJECTION, SOLUTION INTRAMUSCULAR; INTRAVENOUS
Status: DISCONTINUED | OUTPATIENT
Start: 2017-01-01 | End: 2017-01-01 | Stop reason: CLARIF

## 2017-01-01 RX ORDER — LIDOCAINE HYDROCHLORIDE 20 MG/ML
1-50 INJECTION, SOLUTION INFILTRATION; PERINEURAL
Status: DISCONTINUED | OUTPATIENT
Start: 2017-01-01 | End: 2017-01-01

## 2017-01-01 RX ORDER — PROPOFOL 10 MG/ML
5 VIAL (ML) INTRAVENOUS
Status: DISCONTINUED | OUTPATIENT
Start: 2017-01-01 | End: 2017-01-01

## 2017-01-01 RX ORDER — MANNITOL 20 G/100ML
1 INJECTION, SOLUTION INTRAVENOUS
Status: COMPLETED | OUTPATIENT
Start: 2017-01-01 | End: 2017-01-01

## 2017-01-01 RX ORDER — SODIUM CHLORIDE 0.9 % (FLUSH) 0.9 %
10-30 SYRINGE (ML) INJECTION AS NEEDED
Status: DISCONTINUED | OUTPATIENT
Start: 2017-01-01 | End: 2017-01-01 | Stop reason: HOSPADM

## 2017-01-01 RX ORDER — SODIUM CHLORIDE 0.9 % (FLUSH) 0.9 %
10 SYRINGE (ML) INJECTION AS NEEDED
Status: DISCONTINUED | OUTPATIENT
Start: 2017-01-01 | End: 2017-01-01 | Stop reason: HOSPADM

## 2017-01-01 RX ORDER — DEXTROSE MONOHYDRATE AND SODIUM CHLORIDE 5; .9 G/100ML; G/100ML
0.75 INJECTION, SOLUTION INTRAVENOUS CONTINUOUS
Status: DISCONTINUED | OUTPATIENT
Start: 2017-01-01 | End: 2017-01-01

## 2017-01-01 RX ORDER — ROCURONIUM BROMIDE 10 MG/ML
INJECTION, SOLUTION INTRAVENOUS AS NEEDED
Status: DISCONTINUED | OUTPATIENT
Start: 2017-01-01 | End: 2017-01-01 | Stop reason: HOSPADM

## 2017-01-01 RX ORDER — ACETAMINOPHEN 650 MG/1
650 SUPPOSITORY RECTAL
Status: DISCONTINUED | OUTPATIENT
Start: 2017-01-01 | End: 2017-01-01 | Stop reason: HOSPADM

## 2017-01-01 RX ORDER — LABETALOL HCL 20 MG/4 ML
5 SYRINGE (ML) INTRAVENOUS
Status: DISCONTINUED | OUTPATIENT
Start: 2017-01-01 | End: 2017-01-01

## 2017-01-01 RX ORDER — CHLORHEXIDINE GLUCONATE 1.2 MG/ML
15 RINSE ORAL EVERY 12 HOURS
Status: DISCONTINUED | OUTPATIENT
Start: 2017-01-01 | End: 2017-01-01 | Stop reason: HOSPADM

## 2017-01-01 RX ORDER — HYDRALAZINE HYDROCHLORIDE 20 MG/ML
20 INJECTION INTRAMUSCULAR; INTRAVENOUS ONCE
Status: COMPLETED | OUTPATIENT
Start: 2017-01-01 | End: 2017-01-01

## 2017-01-01 RX ORDER — BACITRACIN 500 UNIT/G
1 PACKET (EA) TOPICAL AS NEEDED
Status: DISCONTINUED | OUTPATIENT
Start: 2017-01-01 | End: 2017-01-01 | Stop reason: HOSPADM

## 2017-01-01 RX ORDER — BISACODYL 5 MG
5 TABLET, DELAYED RELEASE (ENTERIC COATED) ORAL DAILY PRN
Status: DISCONTINUED | OUTPATIENT
Start: 2017-01-01 | End: 2017-01-01

## 2017-01-01 RX ORDER — PHENYLEPHRINE HCL IN 0.9% NACL 30MG/250ML
0-200 PLASTIC BAG, INJECTION (ML) INTRAVENOUS
Status: DISCONTINUED | OUTPATIENT
Start: 2017-01-01 | End: 2017-01-01

## 2017-01-01 RX ORDER — PROPOFOL 10 MG/ML
5-50 VIAL (ML) INTRAVENOUS
Status: DISCONTINUED | OUTPATIENT
Start: 2017-01-01 | End: 2017-01-01 | Stop reason: HOSPADM

## 2017-01-01 RX ORDER — POTASSIUM CHLORIDE 29.8 MG/ML
20 INJECTION INTRAVENOUS ONCE
Status: COMPLETED | OUTPATIENT
Start: 2017-01-01 | End: 2017-01-01

## 2017-01-01 RX ORDER — LIDOCAINE HYDROCHLORIDE 20 MG/ML
INJECTION, SOLUTION INFILTRATION; PERINEURAL
Status: COMPLETED
Start: 2017-01-01 | End: 2017-01-01

## 2017-01-01 RX ORDER — ETOMIDATE 2 MG/ML
INJECTION INTRAVENOUS
Status: COMPLETED | OUTPATIENT
Start: 2017-01-01 | End: 2017-01-01

## 2017-01-01 RX ORDER — DEXTROSE 50 % IN WATER (D50W) INTRAVENOUS SYRINGE
25-50 AS NEEDED
Status: DISCONTINUED | OUTPATIENT
Start: 2017-01-01 | End: 2017-01-01

## 2017-01-01 RX ORDER — SUCCINYLCHOLINE CHLORIDE 20 MG/ML
INJECTION INTRAMUSCULAR; INTRAVENOUS
Status: COMPLETED | OUTPATIENT
Start: 2017-01-01 | End: 2017-01-01

## 2017-01-01 RX ORDER — BACITRACIN 500 UNIT/G
PACKET (EA) TOPICAL
Status: DISCONTINUED
Start: 2017-01-01 | End: 2017-01-01

## 2017-01-01 RX ORDER — ALBUTEROL SULFATE 0.83 MG/ML
2.5 SOLUTION RESPIRATORY (INHALATION)
Status: DISCONTINUED | OUTPATIENT
Start: 2017-01-01 | End: 2017-01-01 | Stop reason: HOSPADM

## 2017-01-01 RX ORDER — PANTOPRAZOLE SODIUM 40 MG/1
40 GRANULE, DELAYED RELEASE ORAL EVERY 12 HOURS
Status: DISCONTINUED | OUTPATIENT
Start: 2017-01-01 | End: 2017-01-01

## 2017-01-01 RX ORDER — MIDAZOLAM HYDROCHLORIDE 1 MG/ML
4 INJECTION, SOLUTION INTRAMUSCULAR; INTRAVENOUS ONCE
Status: DISCONTINUED | OUTPATIENT
Start: 2017-01-01 | End: 2017-01-01 | Stop reason: SDUPTHER

## 2017-01-01 RX ORDER — CEFAZOLIN SODIUM IN 0.9 % NACL 2 G/100 ML
PLASTIC BAG, INJECTION (ML) INTRAVENOUS AS NEEDED
Status: DISCONTINUED | OUTPATIENT
Start: 2017-01-01 | End: 2017-01-01 | Stop reason: HOSPADM

## 2017-01-01 RX ORDER — INSULIN LISPRO 100 [IU]/ML
INJECTION, SOLUTION INTRAVENOUS; SUBCUTANEOUS EVERY 6 HOURS
Status: DISCONTINUED | OUTPATIENT
Start: 2017-01-01 | End: 2017-01-01

## 2017-01-01 RX ORDER — AMOXICILLIN 250 MG
2 CAPSULE ORAL
Status: DISCONTINUED | OUTPATIENT
Start: 2017-01-01 | End: 2017-01-01

## 2017-01-01 RX ORDER — FACIAL-BODY WIPES
10 EACH TOPICAL DAILY PRN
Status: DISCONTINUED | OUTPATIENT
Start: 2017-01-01 | End: 2017-01-01 | Stop reason: HOSPADM

## 2017-01-01 RX ORDER — SODIUM CHLORIDE 0.9 % (FLUSH) 0.9 %
20 SYRINGE (ML) INJECTION EVERY 24 HOURS
Status: DISCONTINUED | OUTPATIENT
Start: 2017-01-01 | End: 2017-01-01 | Stop reason: HOSPADM

## 2017-01-01 RX ORDER — SODIUM CHLORIDE 0.9 % (FLUSH) 0.9 %
10 SYRINGE (ML) INJECTION EVERY 24 HOURS
Status: DISCONTINUED | OUTPATIENT
Start: 2017-01-01 | End: 2017-01-01 | Stop reason: HOSPADM

## 2017-01-01 RX ORDER — LIDOCAINE HYDROCHLORIDE 10 MG/ML
1-5 INJECTION INFILTRATION; PERINEURAL
Status: COMPLETED | OUTPATIENT
Start: 2017-01-01 | End: 2017-01-01

## 2017-01-01 RX ORDER — GLYCOPYRROLATE 0.2 MG/ML
0.2 INJECTION INTRAMUSCULAR; INTRAVENOUS
Status: DISCONTINUED | OUTPATIENT
Start: 2017-01-01 | End: 2017-01-01 | Stop reason: HOSPADM

## 2017-01-01 RX ADMIN — FAMOTIDINE 20 MG: 10 INJECTION INTRAVENOUS at 13:53

## 2017-01-01 RX ADMIN — NICARDIPINE HYDROCHLORIDE 15 MG/HR: 2.5 INJECTION INTRAVENOUS at 10:56

## 2017-01-01 RX ADMIN — POTASSIUM CHLORIDE 20 MEQ: 400 INJECTION, SOLUTION INTRAVENOUS at 15:43

## 2017-01-01 RX ADMIN — IOPAMIDOL 72 ML: 755 INJECTION, SOLUTION INTRAVENOUS at 21:01

## 2017-01-01 RX ADMIN — CHLORHEXIDINE GLUCONATE 15 ML: 1.2 RINSE ORAL at 09:29

## 2017-01-01 RX ADMIN — FOLIC ACID: 5 INJECTION, SOLUTION INTRAMUSCULAR; INTRAVENOUS; SUBCUTANEOUS at 10:23

## 2017-01-01 RX ADMIN — LEVETIRACETAM 500 MG: 100 INJECTION, SOLUTION INTRAVENOUS at 00:12

## 2017-01-01 RX ADMIN — MIDAZOLAM HYDROCHLORIDE 4 MG: 1 INJECTION, SOLUTION INTRAMUSCULAR; INTRAVENOUS at 17:27

## 2017-01-01 RX ADMIN — DOCUSATE SODIUM AND SENNOSIDES 2 TABLET: 8.6; 5 TABLET, FILM COATED ORAL at 00:01

## 2017-01-01 RX ADMIN — MANNITOL 54.4 G: 20 INJECTION, SOLUTION INTRAVENOUS at 16:42

## 2017-01-01 RX ADMIN — DEXTROSE MONOHYDRATE 100 ML/HR: 5 INJECTION, SOLUTION INTRAVENOUS at 08:00

## 2017-01-01 RX ADMIN — Medication 20 ML: at 13:00

## 2017-01-01 RX ADMIN — SUCCINYLCHOLINE CHLORIDE 100 MG: 20 INJECTION, SOLUTION INTRAMUSCULAR; INTRAVENOUS at 15:46

## 2017-01-01 RX ADMIN — DOCUSATE SODIUM AND SENNOSIDES 2 TABLET: 8.6; 5 TABLET, FILM COATED ORAL at 22:11

## 2017-01-01 RX ADMIN — ROCURONIUM BROMIDE 50 MG: 10 INJECTION, SOLUTION INTRAVENOUS at 18:20

## 2017-01-01 RX ADMIN — PROPOFOL 5 MCG/KG/MIN: 10 INJECTION, EMULSION INTRAVENOUS at 11:57

## 2017-01-01 RX ADMIN — FAMOTIDINE 20 MG: 10 INJECTION INTRAVENOUS at 08:11

## 2017-01-01 RX ADMIN — Medication 20 ML: at 14:00

## 2017-01-01 RX ADMIN — FAMOTIDINE 20 MG: 10 INJECTION INTRAVENOUS at 10:34

## 2017-01-01 RX ADMIN — LIDOCAINE HYDROCHLORIDE 40 MG: 20 INJECTION, SOLUTION INFILTRATION; PERINEURAL at 20:13

## 2017-01-01 RX ADMIN — CHLORHEXIDINE GLUCONATE 15 ML: 1.2 RINSE ORAL at 21:19

## 2017-01-01 RX ADMIN — ACETAMINOPHEN 650 MG: 650 SUPPOSITORY RECTAL at 09:51

## 2017-01-01 RX ADMIN — Medication 20 ML: at 12:15

## 2017-01-01 RX ADMIN — Medication 16 MCG/MIN: at 15:28

## 2017-01-01 RX ADMIN — Medication 50 MCG/KG/MIN: at 19:59

## 2017-01-01 RX ADMIN — LIDOCAINE HYDROCHLORIDE: 10 INJECTION, SOLUTION EPIDURAL; INFILTRATION; INTRACAUDAL; PERINEURAL at 17:15

## 2017-01-01 RX ADMIN — MAGNESIUM SULFATE HEPTAHYDRATE 1 G: 1 INJECTION, SOLUTION INTRAVENOUS at 23:54

## 2017-01-01 RX ADMIN — GLYCOPYRROLATE 0.2 MG: 0.2 INJECTION, SOLUTION INTRAMUSCULAR; INTRAVENOUS at 17:44

## 2017-01-01 RX ADMIN — DEXTROSE MONOHYDRATE AND SODIUM CHLORIDE 100 ML/HR: 5; .9 INJECTION, SOLUTION INTRAVENOUS at 16:47

## 2017-01-01 RX ADMIN — DEXTROSE MONOHYDRATE AND SODIUM CHLORIDE 0.75 ML/KG/HR: 5; .9 INJECTION, SOLUTION INTRAVENOUS at 22:55

## 2017-01-01 RX ADMIN — INSULIN LISPRO 2 UNITS: 100 INJECTION, SOLUTION INTRAVENOUS; SUBCUTANEOUS at 06:12

## 2017-01-01 RX ADMIN — POTASSIUM CHLORIDE 20 MEQ: 400 INJECTION, SOLUTION INTRAVENOUS at 01:47

## 2017-01-01 RX ADMIN — Medication 10 ML: at 22:12

## 2017-01-01 RX ADMIN — SODIUM CHLORIDE: 9 INJECTION, SOLUTION INTRAVENOUS at 18:10

## 2017-01-01 RX ADMIN — FOLIC ACID: 5 INJECTION, SOLUTION INTRAMUSCULAR; INTRAVENOUS; SUBCUTANEOUS at 23:52

## 2017-01-01 RX ADMIN — Medication 10 ML: at 06:08

## 2017-01-01 RX ADMIN — Medication 10 ML: at 14:00

## 2017-01-01 RX ADMIN — POTASSIUM CHLORIDE 20 MEQ: 400 INJECTION, SOLUTION INTRAVENOUS at 03:40

## 2017-01-01 RX ADMIN — ACETAMINOPHEN 650 MG: 650 SUPPOSITORY RECTAL at 14:01

## 2017-01-01 RX ADMIN — POTASSIUM CHLORIDE 20 MEQ: 400 INJECTION, SOLUTION INTRAVENOUS at 23:54

## 2017-01-01 RX ADMIN — INSULIN LISPRO 2 UNITS: 100 INJECTION, SOLUTION INTRAVENOUS; SUBCUTANEOUS at 12:25

## 2017-01-01 RX ADMIN — DEXTROSE MONOHYDRATE AND SODIUM CHLORIDE 30 ML/HR: 5; .9 INJECTION, SOLUTION INTRAVENOUS at 09:01

## 2017-01-01 RX ADMIN — ROCURONIUM BROMIDE 30 MG: 10 INJECTION, SOLUTION INTRAVENOUS at 19:51

## 2017-01-01 RX ADMIN — LABETALOL HYDROCHLORIDE 5 MG: 5 INJECTION, SOLUTION INTRAVENOUS at 08:06

## 2017-01-01 RX ADMIN — INSULIN LISPRO 2 UNITS: 100 INJECTION, SOLUTION INTRAVENOUS; SUBCUTANEOUS at 00:14

## 2017-01-01 RX ADMIN — CHLORHEXIDINE GLUCONATE 15 ML: 1.2 RINSE ORAL at 10:34

## 2017-01-01 RX ADMIN — HYDRALAZINE HYDROCHLORIDE 20 MG: 20 INJECTION INTRAMUSCULAR; INTRAVENOUS at 17:14

## 2017-01-01 RX ADMIN — Medication 10 ML: at 13:00

## 2017-01-01 RX ADMIN — Medication 10 ML: at 22:06

## 2017-01-01 RX ADMIN — Medication 10 ML: at 15:16

## 2017-01-01 RX ADMIN — CHLORHEXIDINE GLUCONATE 15 ML: 1.2 RINSE ORAL at 20:08

## 2017-01-01 RX ADMIN — Medication 90 MCG/MIN: at 15:57

## 2017-01-01 RX ADMIN — Medication 10 ML: at 12:15

## 2017-01-01 RX ADMIN — ONDANSETRON 1 MG: 2 INJECTION INTRAMUSCULAR; INTRAVENOUS at 08:06

## 2017-01-01 RX ADMIN — NICARDIPINE HYDROCHLORIDE 10 MG/HR: 2.5 INJECTION INTRAVENOUS at 16:44

## 2017-01-01 RX ADMIN — ETOMIDATE 20 MG: 2 INJECTION, SOLUTION INTRAVENOUS at 15:45

## 2017-01-01 RX ADMIN — SODIUM CHLORIDE 500 ML: 900 INJECTION, SOLUTION INTRAVENOUS at 10:39

## 2017-01-01 RX ADMIN — LEVETIRACETAM 500 MG: 100 INJECTION, SOLUTION INTRAVENOUS at 11:55

## 2017-01-01 RX ADMIN — PANTOPRAZOLE SODIUM 40 MG: 40 GRANULE, DELAYED RELEASE ORAL at 00:01

## 2017-01-01 RX ADMIN — CHLORHEXIDINE GLUCONATE 15 ML: 1.2 RINSE ORAL at 08:14

## 2017-01-01 RX ADMIN — LIDOCAINE HYDROCHLORIDE 3 ML: 10 INJECTION, SOLUTION INFILTRATION; PERINEURAL at 12:00

## 2017-01-01 RX ADMIN — LABETALOL HYDROCHLORIDE 5 MG: 5 INJECTION, SOLUTION INTRAVENOUS at 22:16

## 2017-01-01 RX ADMIN — SODIUM CHLORIDE 1 MG/MIN: 900 INJECTION, SOLUTION INTRAVENOUS at 14:17

## 2017-01-01 RX ADMIN — NICARDIPINE HYDROCHLORIDE 2 MG/HR: 2.5 INJECTION INTRAVENOUS at 00:05

## 2017-01-01 RX ADMIN — CHLORHEXIDINE GLUCONATE 15 ML: 1.2 RINSE ORAL at 23:00

## 2017-01-01 RX ADMIN — LEVETIRACETAM 500 MG: 100 INJECTION, SOLUTION INTRAVENOUS at 13:27

## 2017-01-01 RX ADMIN — INSULIN LISPRO 2 UNITS: 100 INJECTION, SOLUTION INTRAVENOUS; SUBCUTANEOUS at 06:10

## 2017-01-01 RX ADMIN — DEXTROSE MONOHYDRATE, SODIUM CHLORIDE, AND POTASSIUM CHLORIDE 1000 ML: 50; 9; 2.98 INJECTION, SOLUTION INTRAVENOUS at 23:49

## 2017-01-01 RX ADMIN — LIDOCAINE HYDROCHLORIDE 20 MG: 20 INJECTION, SOLUTION INFILTRATION; PERINEURAL at 18:50

## 2017-01-01 RX ADMIN — DEXTROSE MONOHYDRATE, SODIUM CHLORIDE, AND POTASSIUM CHLORIDE: 50; 9; 2.98 INJECTION, SOLUTION INTRAVENOUS at 14:26

## 2017-01-01 RX ADMIN — Medication 2 G: at 18:36

## 2017-06-30 PROBLEM — G93.6 CEREBRAL EDEMA (HCC): Status: ACTIVE | Noted: 2017-01-01

## 2017-06-30 PROBLEM — E87.6 HYPOKALEMIA: Status: ACTIVE | Noted: 2017-01-01

## 2017-06-30 PROBLEM — G93.5 BRAIN COMPRESSION (HCC): Status: ACTIVE | Noted: 2017-01-01

## 2017-06-30 PROBLEM — G91.9 HYDROCEPHALUS (HCC): Status: ACTIVE | Noted: 2017-01-01

## 2017-06-30 PROBLEM — I61.5 IVH (INTRAVENTRICULAR HEMORRHAGE) (HCC): Status: ACTIVE | Noted: 2017-01-01

## 2017-06-30 PROBLEM — Z78.9 CENTRAL VENOUS CATHETER IN PLACE: Status: ACTIVE | Noted: 2017-01-01

## 2017-06-30 PROBLEM — F14.10 COCAINE ABUSE (HCC): Status: ACTIVE | Noted: 2017-01-01

## 2017-06-30 PROBLEM — G93.89 RESPIRATORY CENTER FAILURE: Status: ACTIVE | Noted: 2017-01-01

## 2017-06-30 PROBLEM — Z98.2 S/P VENTRICULAR SHUNT PLACEMENT: Status: ACTIVE | Noted: 2017-01-01

## 2017-06-30 PROBLEM — I10 MALIGNANT HYPERTENSION REQUIRING ACUTE INTENSIVE MANAGEMENT: Status: ACTIVE | Noted: 2017-01-01

## 2017-06-30 PROBLEM — I61.9 ICH (INTRACEREBRAL HEMORRHAGE) (HCC): Status: ACTIVE | Noted: 2017-01-01

## 2017-06-30 NOTE — ANESTHESIA PREPROCEDURE EVALUATION
Anesthetic History   No history of anesthetic complications            Review of Systems / Medical History  Patient summary reviewed and pertinent labs reviewed    Pulmonary  Within defined limits                 Neuro/Psych     seizures  CVA       Cardiovascular  Within defined limits  Hypertension: poorly controlled                Comments: Cocaine use  Hypokalemia  Resp failure   GI/Hepatic/Renal  Within defined limits              Endo/Other  Within defined limits           Other Findings            Physical Exam    Airway  Mallampati: II  TM Distance: 4 - 6 cm  Neck ROM: normal range of motion   Mouth opening: Normal  Intubated   Cardiovascular               Dental         Pulmonary                 Abdominal  GI exam deferred       Other Findings            Anesthetic Plan    ASA: 4, emergent  Anesthesia type: general          Induction: Intravenous  Anesthetic plan and risks discussed with: Patient

## 2017-06-30 NOTE — ED TRIAGE NOTES
Per EMS, \"Patient was found unresponsive at a friends house, they had no information on patient.  No drug paraphernalia

## 2017-06-30 NOTE — TELEPHONE ENCOUNTER
56year-old female found down. Dr. Moose Shook called Dr. Colletta Sessions and me about this patient in the [facility] ED. See his note for details. Briefly, she was found unresponsive on the floor covered in vomit. She was brought to the [facility] ED.  GCS 5. NIHSS 34. Pupils notes as left 3mm not reactive, right 1-2mm not reactive. T 35.9C. /105. CT personally reviewed shows ICH left thalamus approximately 32mm x 30mm x 22m. There is extension into the third ventricle resulting in blood in the left more than right lateral ventricles which are enlarged, aqueduct, and fourth ventricle. She was intubated. Blood pressure control initiated. Platelet count and coags normal.    Her pupils were small at presentation. There is concern for drug use. The resultant toxic/metabolic encephalopathy may be contributing to her poor exam in addition to the ICH/IVH and hydrocephalus. Ventriculostomy placement may help relieve the contribution from elevated ICP due to obstructive hydrocephalus as discussed with Dr. Moose Shook. However, her prognosis would remain poor. No legally authorized representatives are currently available. The patient will be transferred to the [facility] ED. Emergent EVD placement will be considered. The patient will be admitted to the Hospitalist service with consultation by [facility] and general neurology. The neurovascular service will help with questions regarding EVD management.     Discussed with Dr. Radha Stein in the [facility] ED.

## 2017-06-30 NOTE — ED PROVIDER NOTES
HPI Comments: 6:25 PM Luisa De La Rosa is a 56 y.o. Female who presents to ED after being transferred from [facility] to be seen by Dr. Lily León. The patient was found at a friends house on the floor unresponsive covered in vomit. There is suspected drug use. She was known to be hypertensive in the ED at [facility] and was treated with Hydralazine and intubated. On CT she was found to have a left thalamic hemorrhage with interventricular extension and cerebral edema. PCP: PROVIDER UNKNOWN    The history is provided by medical records. No past medical history on file. No past surgical history on file. No family history on file. Social History     Social History    Marital status:      Spouse name: N/A    Number of children: N/A    Years of education: N/A     Occupational History    Not on file. Social History Main Topics    Smoking status: Not on file    Smokeless tobacco: Not on file    Alcohol use Not on file    Drug use: Not on file    Sexual activity: Not on file     Other Topics Concern    Not on file     Social History Narrative         ALLERGIES: Review of patient's allergies indicates not on file. Review of Systems   Unable to perform ROS: Intubated       There were no vitals filed for this visit. Physical Exam   Constitutional: She appears well-developed and well-nourished. She appears distressed. 56year old female in severe neurological distress. Intubated. Decerebrate posturing. HENT:   Head: Normocephalic and atraumatic. Right Ear: External ear normal.   Left Ear: External ear normal.   Nose: Nose normal.   Eyes: Conjunctivae are normal. Right eye exhibits no discharge. Left eye exhibits no discharge. No scleral icterus. Neck: Neck supple. No JVD present. No tracheal deviation present. No thyromegaly present. Cardiovascular: Normal rate, regular rhythm, normal heart sounds and intact distal pulses.   Exam reveals no gallop and no friction rub. No murmur heard. Pulmonary/Chest: No stridor. No respiratory distress. She has no wheezes. She has no rales. She exhibits no tenderness. Course breath sound with on ventilator. Abdominal: Soft. Bowel sounds are normal. She exhibits no distension and no mass. Musculoskeletal: She exhibits no edema or tenderness. Lymphadenopathy:     She has no cervical adenopathy. Neurological: No cranial nerve deficit. Pupils pinpoint.  (+) Gag reflex. Skin: Skin is warm and dry. No rash noted. She is not diaphoretic. No erythema. No pallor. Psychiatric:   Unable to assess. Nursing note and vitals reviewed. [facility]  ED Course       Procedures      Patient taken directly to the neurointerventional suite      Diagnosis:   1. IVH (intraventricular hemorrhage) ([address]    2. Left-sided nontraumatic intraventricular intracerebral hemorrhage ([address]    3. Cocaine abuse    4. Hypokalemia    5. Cerebral edema (HCC)    6. Hydrocephalus          Disposition: Admitted    Follow-up Information     None              Scribe Attestation  Anitha Lee acting as a scribe for and in the presence of Juanito Heaton DO  June 30, 2017 at 6:34 PM       Provider Attestation:  I personally performed the services described in the documentation, reviewed the documentation, as recorded by the scribe in my presence, and it accurately and completely records my words and actions.   Juanito Heaton DO      Signed by: Anitha Calvert, June 30, 2017 at 6:34 PM

## 2017-06-30 NOTE — ED PROVIDER NOTES
HPI Comments: 3:21 PM Monica Saini is a 56 y.o. female who presents to the ED for evaluation of unresponsiveness. Per EMS, pt was found on the floor of a friend's house covered in vomit. Pt's friend poured cold water on her trying to arouse her. Pt's friend states that there could possibly by drug use but they are not positive and denies pt using drugs with them, per EMS. There are no other sx or complaints. No past medical history on file. No past surgical history on file. No family history on file. Social History     Social History    Marital status:      Spouse name: N/A    Number of children: N/A    Years of education: N/A     Occupational History    Not on file. Social History Main Topics    Smoking status: Not on file    Smokeless tobacco: Not on file    Alcohol use Not on file    Drug use: Not on file    Sexual activity: Not on file     Other Topics Concern    Not on file     Social History Narrative         ALLERGIES: Review of patient's allergies indicates not on file. Review of Systems   Unable to perform ROS: Patient unresponsive       There were no vitals filed for this visit. Physical Exam   Constitutional: She appears well-developed and well-nourished. HENT:   Head: Normocephalic. Eyes:   Left pupil 3 mm right pupil 1-2   Neither is reactive   Cardiovascular: Normal rate and regular rhythm. Pulmonary/Chest: Effort normal.   Abdominal: Soft. Musculoskeletal: Normal range of motion. Neurological:   Unresponsive   Skin: Skin is warm and dry. MDM  Number of Diagnoses or Management Options  Thalamic hemorrhage [facility]):   Diagnosis management comments: The patient seen immediately upon arrival.  High concern for intracranial hemorrhage considering hypotensive and unresponsive. Patient was taken immediately to CAT scan a code S was called following the CT was obvious she had intracranial hemorrhage.   She was unresponsive  Not protecting her airway but surprisingly breathing fairly normally and having oxygen sats 100%. patient did have decerebrate posturing just prior to intubation   patient was intubated using etomidate and succinylcholine with glidescope without difficulty. Nicardipine drip was started and she is currently intubated but not sedated. neuro exam unchanged just prior to intubation. D/w dr. Shashi Mackey;  Request transfer to Dr. Wendi Tovar at [facility] via ER to ER  ekg  nsr at 84; nl axis. Nl int. Noste/d. LVH. Nicardipine maxed out, mannitol infusing. Transports here. tox positive for cocaine; avoid betablocker. Will add hydralzine. ED Course       Procedures                       Scribe Attestation:   I, Sanket Christian, am scribing for and in the presence of No att. providers found on this day 06/30/17 at 3:21 PM   tim Nuñez    Provider Attestation:  I personally performed the services described in the documentation, reviewed the documentation, as recorded by the scribe in my presence, and it accurately and completely records my words and actions. No att. providers found.  3:21 PM      Signed by: Tim Nuñez, 3:21 PM

## 2017-06-30 NOTE — ED NOTES
Patient threw up while transferring her to the CT table. Attempts to suction were unsuccessful. Patient jaw was clenched shut.

## 2017-06-30 NOTE — PROGRESS NOTES
Pt arrives in the neurovascular suite accompanied by ED RN and transport. Pt arrives with calderon catheter, ETT, OGT in place. Pt is unresponsive. No family available. ART line and EVD placed emergently. TRANSFER - OUT REPORT:    Verbal report given to Maldonado Recinos RN (name) on Vinny Butler  being transferred to NSICU (unit) for routine progression of care       Report consisted of patients Situation, Background, Assessment and   Recommendations(SBAR). Information from the following report(s) SBAR, Procedure Summary and Intake/Output was reviewed with the receiving nurse. Lines:   Triple Lumen 06/30/17 Left Femoral (Active)       Peripheral IV 06/30/17 Right Forearm (Active)   Site Assessment Clean, dry, & intact 6/30/2017  4:51 PM   Phlebitis Assessment 0 6/30/2017  4:51 PM   Infiltration Assessment 0 6/30/2017  4:51 PM   Dressing Status Clean, dry, & intact 6/30/2017  4:51 PM   Dressing Type Transparent 6/30/2017  4:51 PM       Peripheral IV 06/30/17 Left Forearm (Active)   Site Assessment Clean, dry, & intact 6/30/2017  4:51 PM   Phlebitis Assessment 0 6/30/2017  4:51 PM   Infiltration Assessment 0 6/30/2017  4:51 PM   Dressing Status Clean, dry, & intact 6/30/2017  4:51 PM   Dressing Type Transparent 6/30/2017  4:51 PM       Peripheral IV 06/30/17 Left Antecubital (Active)   Site Assessment Clean, dry, & intact 6/30/2017  5:12 PM   Phlebitis Assessment 0 6/30/2017  5:12 PM   Infiltration Assessment 0 6/30/2017  5:12 PM   Dressing Status Clean, dry, & intact 6/30/2017  5:12 PM   Dressing Type Transparent 6/30/2017  5:12 PM       Arterial Line 06/30/17 Left Radial artery (Active)        Opportunity for questions and clarification was provided. Patient transported with:   Monitor  Registered Nurse Naina Ceballos RN. VSS on portable monitor. Pt remains sedated. Dual pulse and groin checks done. No signs of pain.

## 2017-07-01 PROBLEM — E87.6 HYPOKALEMIA: Status: RESOLVED | Noted: 2017-01-01 | Resolved: 2017-01-01

## 2017-07-01 NOTE — CONSULTS
Neurovascular    Consultation Note        Patient: Lei Baeza MRN: [id number]  SSN: [id number]    YOB: 1960  Age: 56 y.o. Sex: female        ID: 56year-old female with a long history of cocaine use and hypertension   CC: found down. HPI:  Dr. Patricia Juarez called Dr. Mor Mcqueen and me about this patient in the [facility] ED. See his note for details. Briefly, she was found unresponsive on the floor covered in vomit. She was brought to the [facility] ED.  GCS 5. NIHSS 34. Pupils noted as left 3mm not reactive, right 1-2mm not reactive. T 35.9C. /105.        CT personally reviewed shows ICH left thalamus approximately 32mm x 30mm x 22m. There is extension into the third ventricle resulting in blood in the left more than right lateral ventricles which are enlarged, aqueduct, and fourth ventricle.       She was intubated. Blood pressure control initiated. Platelet count and coags normal.  Mannitol initiated.     Her pupils were small at presentation. There was concern for drug use confirmed to be at least cocaine with urine drug screen. The resultant toxic/metabolic encephalopathy may have contributied to her poor exam in addition to the ICH/IVH and hydrocephalus. Ventriculostomy placemen was considered to help relieve the contribution from elevated ICP due to obstructive hydrocephalus as discussed with Dr. Patricia Juarez. However, her prognosis would remain poor. No legally authorized representatives are currently available.     The patient was transferred to the [facility] ED. En route she started to swing her arms at the transport personnel until more medications were given. Upon arrival her pupils remained similar to initial presentation left 3mm and right 2mm both not reactive. The patient was brought to the neurovascular suite. A right frontal ventriculostomy was placed in one pass revealing bloody CSF with opening pressure 20cm CSF. After draining a small amount of CSF ICP decreased to 11mmHg. Blood pressure controlled to maintain CPP 60-70. Intraprocedural CT showed the EVD in good position without additional hemorrhage or midline shift. Post procedure CTA showed no evidence of an underlying large of medium vessel neurovascular abnormality to explain the hemorrhage. The patient was taken to the NSICU. Her daughter and son were available post procedure. They relate seeing their other use crack cocaine since they were 13years old (more than 25 years ago). The patient had lived with her daughter until her daughter found the patinet doing drugs while she was supposed to be caring for her granddaughter (daughter's daughter). The patient has been living from friend to friend since that time. They are unaware of any IVDU or amount of ethanol use. Past Medical History:   Diagnosis Date    Crack cocaine use     Hypertension         Past Surgical History:   Procedure Laterality Date           Social History     Social History    Marital status:      Spouse name: N/A    Number of children: N/A    Years of education: N/A     Occupational History    Not on file. Social History Main Topics    Smoking status: Current Every Day Smoker     Types: Cigarettes    Smokeless tobacco: Never Used    Alcohol use No    Drug use: Yes     Special: Cocaine    Sexual activity: Not on file     Other Topics Concern    Not on file     Social History Narrative    No narrative on file        History reviewed. No pertinent family history.      No Known Allergies     Current Facility-Administered Medications   Medication Dose Route Frequency    niCARdipine (CARDENE) 125 mg in 0.9% sodium chloride 250 mL infusion  0-15 mg/hr IntraVENous TITRATE    ELECTROLYTE REPLACEMENT PROTOCOL  1 Each Other Q8H    ELECTROLYTE REPLACEMENT PROTOCOL  1 Each Other Q8H    ELECTROLYTE REPLACEMENT PROTOCOL  1 Each Other Q8H    PHARMACY INFORMATION NOTE  1 Each Other DAILY    dextrose 5% and 0.9% NaCl infusion  0.75 mL/kg/hr IntraVENous CONTINUOUS    chlorhexidine (PERIDEX) 0.12 % mouthwash 15 mL  15 mL Oral Q12H    ondansetron (ZOFRAN) injection 1 mg  1 mg IntraVENous Q6H PRN    labetalol (NORMODYNE;TRANDATE) 20 mg/4 mL (5 mg/mL) injection 5 mg  5 mg IntraVENous Q15MIN PRN    acetaminophen (TYLENOL) tablet 650 mg  650 mg Oral Q4H PRN    acetaminophen (TYLENOL) suppository 650 mg  650 mg Rectal Q4H PRN    senna-docusate (PERICOLACE) 8.6-50 mg per tablet 2 Tab  2 Tab Oral QHS    bisacodyl (DULCOLAX) tablet 5 mg  5 mg Oral DAILY PRN    bisacodyl (DULCOLAX) suppository 10 mg  10 mg Rectal DAILY PRN    albuterol (PROVENTIL VENTOLIN) nebulizer solution 2.5 mg  2.5 mg Nebulization Q4H PRN    [START ON 4/3/2384] folic acid (FOLVITE) 1 mg, thiamine (B-1) 100 mg in 0.9% sodium chloride 50 mL ivpb   IntraVENous DAILY    pantoprazole (PROTONIX) granules for oral suspension 40 mg  40 mg Per NG tube Q12H    propofol (DIPRIVAN) infusion  50 mcg/kg/min (Order-Specific) IntraVENous TITRATE        Review of Systems:   Unable to obtain from patient due to coma. Data:  Recent Results (from the past 24 hour(s))   CBC WITH AUTOMATED DIFF    Collection Time: 06/30/17  3:20 PM   Result Value Ref Range    WBC 13.6 (H) 4.6 - 13.2 K/uL    RBC 5.69 (H) 4.20 - 5.30 M/uL    HGB 14.6 12.0 - 16.0 g/dL    HCT 42.2 35.0 - 45.0 %    MCV 74.2 74.0 - 97.0 FL    MCH 25.7 24.0 - 34.0 PG    MCHC 34.6 31.0 - 37.0 g/dL    RDW 14.4 11.6 - 14.5 %    PLATELET 104 239 - 569 K/uL    MPV 10.8 9.2 - 11.8 FL    NEUTROPHILS 80 (H) 40 - 73 %    LYMPHOCYTES 15 (L) 21 - 52 %    MONOCYTES 4 3 - 10 %    EOSINOPHILS 1 0 - 5 %    BASOPHILS 0 0 - 2 %    ABS. NEUTROPHILS 11.0 (H) 1.8 - 8.0 K/UL    ABS. LYMPHOCYTES 2.0 0.9 - 3.6 K/UL    ABS. MONOCYTES 0.5 0.05 - 1.2 K/UL    ABS. EOSINOPHILS 0.1 0.0 - 0.4 K/UL    ABS.  BASOPHILS 0.0 0.0 - 0.1 K/UL    DF AUTOMATED      PLATELET COMMENTS ADEQUATE PLATELETS      RBC COMMENTS ANISOCYTOSIS  1+       METABOLIC PANEL, COMPREHENSIVE    Collection Time: 06/30/17  3:20 PM   Result Value Ref Range    Sodium 137 136 - 145 mmol/L    Potassium 2.6 (LL) 3.5 - 5.5 mmol/L    Chloride 98 (L) 100 - 108 mmol/L    CO2 28 21 - 32 mmol/L    Anion gap 11 3.0 - 18 mmol/L    Glucose 219 (H) 74 - 99 mg/dL    BUN 25 (H) 7.0 - 18 MG/DL    Creatinine 1.28 0.6 - 1.3 MG/DL    BUN/Creatinine ratio 20 12 - 20      GFR est AA 52 (L) >60 ml/min/1.73m2    GFR est non-AA 43 (L) >60 ml/min/1.73m2    Calcium 9.2 8.5 - 10.1 MG/DL    Bilirubin, total 0.5 0.2 - 1.0 MG/DL    ALT (SGPT) 39 13 - 56 U/L    AST (SGOT) 22 15 - 37 U/L    Alk. phosphatase 133 (H) 45 - 117 U/L    Protein, total 8.3 (H) 6.4 - 8.2 g/dL    Albumin 4.1 3.4 - 5.0 g/dL    Globulin 4.2 (H) 2.0 - 4.0 g/dL    A-G Ratio 1.0 0.8 - 1.7     PROTHROMBIN TIME + INR    Collection Time: 06/30/17  3:20 PM   Result Value Ref Range    Prothrombin time 11.6 11.5 - 15.2 sec    INR 0.9 0.8 - 1.2     MAGNESIUM    Collection Time: 06/30/17  3:20 PM   Result Value Ref Range    Magnesium 2.1 1.6 - 2.6 mg/dL   DRUG SCREEN, URINE    Collection Time: 06/30/17  4:00 PM   Result Value Ref Range    BENZODIAZEPINE NEGATIVE  NEG      BARBITURATES NEGATIVE  NEG      THC (TH-CANNABINOL) NEGATIVE  NEG      OPIATES NEGATIVE  NEG      PCP(PHENCYCLIDINE) NEGATIVE  NEG      COCAINE POSITIVE (A) NEG      AMPHETAMINE NEGATIVE  NEG      METHADONE NEGATIVE  NEG      HDSCOM (NOTE)    POC G3    Collection Time: 06/30/17  4:24 PM   Result Value Ref Range    Device: VENT      FIO2 (POC) 60 %    pH (POC) 7.356 7.35 - 7.45      pCO2 (POC) 57.0 (H) 35.0 - 45.0 MMHG    pO2 (POC) 262 (H) 80 - 100 MMHG    HCO3 (POC) 32.1 (H) 22 - 26 MMOL/L    sO2 (POC) 100 (H) 92 - 97 %    Base excess (POC) 4 mmol/L    Mode ASSIST CONTROL      Tidal volume 450 ml    Set Rate 18 bpm    PEEP/CPAP (POC) 6 cmH2O    PIP (POC) 21      Allens test (POC) N/A      Inspiratory Time 0.9 sec    Total resp.  rate 18      Site RIGHT BRACHIAL      Patient temp. 97.6 Specimen type (POC) ARTERIAL      Performed by Skyler Baptiste     Volume control plus YES     EKG, 12 LEAD, INITIAL    Collection Time: 06/30/17  4:53 PM   Result Value Ref Range    Ventricular Rate 84 BPM    Atrial Rate 84 BPM    P-R Interval 152 ms    QRS Duration 102 ms    Q-T Interval 454 ms    QTC Calculation (Bezet) 536 ms    Calculated P Axis 78 degrees    Calculated R Axis 70 degrees    Calculated T Axis 87 degrees    Diagnosis       Normal sinus rhythm  Biatrial enlargement  Left ventricular hypertrophy with repolarization abnormality  Prolonged QT  Abnormal ECG  No previous ECGs available     TYPE & SCREEN    Collection Time: 06/30/17  6:30 PM   Result Value Ref Range    Crossmatch Expiration 07/03/2017     ABO/Rh(D) Recardo Hilding POSITIVE     Antibody screen NEG    ASPIRIN TEST    Collection Time: 06/30/17  6:30 PM   Result Value Ref Range    Aspirin test 557 ARU   CULTURE, BLOOD    Collection Time: 06/30/17  6:30 PM   Result Value Ref Range    Special Requests: PERIPHERAL      Culture result: PENDING    SED RATE (ESR)    Collection Time: 06/30/17  6:30 PM   Result Value Ref Range    Sed rate, automated 10 0 - 30 mm/hr   LACTIC ACID    Collection Time: 06/30/17  6:30 PM   Result Value Ref Range    Lactic acid 2.0 0.4 - 2.0 MMOL/L   CARDIAC PANEL,(CK, CKMB & TROPONIN)    Collection Time: 06/30/17  6:30 PM   Result Value Ref Range     26 - 192 U/L    CK - MB 4.8 (H) <3.6 ng/ml    CK-MB Index 3.3 0.0 - 4.0 %    Troponin-I, Qt. 0.89 (H) 0.0 - 3.307 NG/ML   METABOLIC PANEL, BASIC    Collection Time: 06/30/17  6:30 PM   Result Value Ref Range    Sodium 129 (L) 136 - 145 mmol/L    Potassium 2.5 (LL) 3.5 - 5.5 mmol/L    Chloride 92 (L) 100 - 108 mmol/L    CO2 26 21 - 32 mmol/L    Anion gap 11 3.0 - 18 mmol/L    Glucose 225 (H) 74 - 99 mg/dL    BUN 25 (H) 7.0 - 18 MG/DL    Creatinine 1.35 (H) 0.6 - 1.3 MG/DL    BUN/Creatinine ratio 19 12 - 20      GFR est AA 49 (L) >60 ml/min/1.73m2    GFR est non-AA 41 (L) >60 ml/min/1.73m2    Calcium 7.9 (L) 8.5 - 10.1 MG/DL   MAGNESIUM    Collection Time: 06/30/17  6:30 PM   Result Value Ref Range    Magnesium 1.8 1.6 - 2.6 mg/dL   PHOSPHORUS    Collection Time: 06/30/17  6:30 PM   Result Value Ref Range    Phosphorus 2.5 2.5 - 4.9 MG/DL        Vent:  Vent Settings  FIO2 (%): 100 %  CMV Rate Set: 12  Back-Up Rate: 12  Vt Set (ml): 450 ml  PEEP/VENT (cm H2O): 5 cm H20  I:E Ratio: 1:5.10  Insp Time (sec): 1 sec  Insp Rise Time %: 50 %  Flow Trigger: 3        Vital Signs:  Patient Vitals for the past 24 hrs:   Pulse Resp BP SpO2   06/30/17 2133 68 12 - 100 %   06/30/17 2039 75 10 155/76 100 %         Physical Exam:  Supine, HOB flat. Appears asthenic with temporal wasting. No response to stimulation after medications. Pupils left 3mm not reactive and right 2mm not reactive. Oral ETT. CV regular. Abd soft. Pulses 1+. Assessment/Plan:  56year-old female who is immediately s/p right frontal ventricular for left thalamic ICH with intraventricular extension resulting in obstructive hydrocephalus in the setting of acute on chronic cocaine use. Her ICP was mildly elevated but not as much as predicted by ventricular prominence may be from chronic neuronal volume loss with ex vacuo dilatation. She did have a brief period of possible response to stimulation during transport until further sedation / paralytics were given. After paralytics dissipate sedation will be weaned to get a better neuro assessment. In the interim EVD 15cm and open but not too low for now as it could result in left to right shift by over draining the right lateral ventricle compared to the left lateral ventricle which has the larger hematoma. No underlying neurovascular lesion or spot sign that suggest a high risk for rebleeding in the short term. She may have a small vessel arteriopathy or arteritis from cocaine use or a just a [place] microaneurysm from hypertension -/+ cocaine use.         The critical nature of her illness was discussed with her daughter and son. They desire all measures be provided for their mother at the present time. The patient has beem admitted to the Hospitalist service with consultation by CCM and general neurology as discussed with Dr. Yousuf Beard and Dr. Smooth Vieira. The neurovascular service will help with questions regarding EVD management. 130 minutes of neurocritical care independent of procedures.     Suzanna Pickering MD

## 2017-07-01 NOTE — CONSULTS
[facility]  Pulmonary, Critical Care, and Sleep Medicine      Name: India Omer MRN: [id number]   : 1960 Hospital: [address] [place] [address]   Date: 2017          Critical Care Initial Patient Consult    Requesting MD:    Dr. Joshua Rose                                              Reason for CC Consult: vent management    IMPRESSION:   · L thalamic ICH with IV extension, obstructive hydrocephalus s/p EVD  · MV for airway protection in setting of ICH  · HTN, currently off cardene  · UDS +cocaine  · Fever, ?central  · Possible aspiration  · Elevated troponin in setting of ICH, cocaine use  · Lactic acidosis, resolved  · DEACON  · Hypokalemia, being replaced  · ECHO EF 50%, LVH, Grade 2 DD      RECOMMENDATIONS:   · Resp -  MV bundle. Currently on SIMV. Follow CXR, ABG, adjust vent prn. No vent weaning for now pending neuro status  · ID - Severe sepsis bundle. Cooling blanket for temp>36.5. Keep euthermic. Await blood, urine, CSF cx. LA normalized. · CVS - Monitor HD. Continue to trend troponin. ECHO noted as below. EKG noted. BP control. PICC in place. IVF. Currently off cardene, bradycardia noted w/ labetalol gtt  · Heme/Onc- hgb and plts stable. ASA non therapeutic. · Metabolic - Replace lytes prn per protocol  · Renal - IVF per protocol D5NS. Follow Uo, Cr  · Endocrine - Check TSH. Cortisol 20.5. SSI prn, avoid hypoglycemia  · Neuro/ Pain/ Sedation - ICH orderset. CPP goal 60-70. Neurovascular managing EVD. Neuro consulted. No AED started for now. UDS +cocaine  · GI - NPO for now, likely start TF in am. Pepcid. LFT ok  · Prophylaxis - DVT-SCD, GI-pepcid  · FULL CODE     Subjective/History: This patient has been seen and evaluated at the request of  for vent management. Patient is a 56 y.o. female with PMH cocaine use, HTN, who presented to the ED after pt was found unresponsive on the floor by friends.  She was initially brought to [facility], in ED pt was hypertensive and CT head showed L thalamus ICH, pt transferred to [facility] for further evaluation and management. Pt was intubated for airway protection, given mannitol, placed on cardene. UDS +cocaine. WBC 13.6, LA 3.3. There was also a question of possible seizure activity. Pt had a R EVD placed by Dr. Bell Villanueva. CT head this am showed increase in L thalamic hematoma with increased IV extension and obstructive hydrocephalus.     Past Medical History:   Diagnosis Date    Crack cocaine use     Hypertension       Past Surgical History:   Procedure Laterality Date    HX CSF SHUNT Right 06/30/2017    EVD right frontal      Prior to Admission medications    Not on File     Current Facility-Administered Medications   Medication Dose Route Frequency    insulin lispro (HUMALOG) injection   SubCUTAneous Q6H    famotidine (PF) (PEPCID) injection 20 mg  20 mg IntraVENous DAILY    sodium chloride (NS) flush 10 mL  10 mL InterCATHeter Q24H    sodium chloride (NS) flush 10-40 mL  10-40 mL InterCATHeter Q8H    sodium chloride (NS) flush 20 mL  20 mL InterCATHeter Q24H    labetalol (NORMODYNE;TRANDATE) 300 mg in 0.9% sodium chloride 150 mL (2 mg / 1 mL) infusion  0.5-2 mg/min IntraVENous TITRATE    potassium chloride 20 mEq in 50 ml IVPB  20 mEq IntraVENous ONCE    ELECTROLYTE REPLACEMENT PROTOCOL  1 Each Other Q8H    ELECTROLYTE REPLACEMENT PROTOCOL  1 Each Other Q8H    ELECTROLYTE REPLACEMENT PROTOCOL  1 Each Other Q8H    PHARMACY INFORMATION NOTE  1 Each Other DAILY    chlorhexidine (PERIDEX) 0.12 % mouthwash 15 mL  15 mL Oral Q12H    senna-docusate (PERICOLACE) 8.6-50 mg per tablet 2 Tab  2 Tab Oral QHS    niCARdipine (CARDENE) 125 mg in 0.9% sodium chloride 250 mL infusion  0-15 mg/hr IntraVENous TITRATE    folic acid (FOLVITE) 1 mg, thiamine (B-1) 100 mg in 0.9% sodium chloride 50 mL ivpb   IntraVENous DAILY    dextrose 5% - 0.9% NaCl with KCl 40 mEq/L infusion   IntraVENous CONTINUOUS     No Known Allergies   Social History Substance Use Topics    Smoking status: Current Every Day Smoker     Types: Cigarettes    Smokeless tobacco: Never Used    Alcohol use No      History reviewed. No pertinent family history. Review of Systems:  Review of systems not obtained due to patient factors.     Objective:   Vital Signs:    Visit Vitals    BP 92/61    Pulse (!) 57    Temp 98.1 °F (36.7 °C)    Resp 10    Ht 5' 5\" (1.651 m)    Wt 47.5 kg (104 lb 11.5 oz)    SpO2 100%    Breastfeeding No    BMI 17.43 kg/m2       O2 Device: Ventilator       Temp (24hrs), Av.7 °F (36.5 °C), Min:91 °F (32.8 °C), Max:101.3 °F (38.5 °C)       Intake/Output:   Last shift:      701 - 1900  In: 473.3 [I.V.:473.3]  Out: 435 [Urine:365; Drains:70]  Last 3 shifts: 1901 -  0700  In: 1049.6 [I.V.:989.6]  Out: 3160 [ODIWQ:7265; Drains:44]    Intake/Output Summary (Last 24 hours) at 17 1719  Last data filed at 17 1600   Gross per 24 hour   Intake           1522.9 ml   Output             2219 ml   Net           -696.1 ml       Ventilator Settings:  Mode Rate Tidal Volume Pressure FiO2 PEEP   SIMV, VC+   500 ml  10 cm H2O 30 % 5 cm H20     Peak airway pressure: 21 cm H2O    Minute ventilation: 4.97 l/min          Physical Exam:  General: Inubated, not on sedation, no WD, pupils NR, L deviation, on SIMV  HEENT: EVD in place, ETT in place  Lungs: Clear, good AE B, no wheezes, rales or rhonchi  CV: Regular, no murmur  Abd: soft, NT, ND  Ext: No edema, PICC in place RUE  Skin: warm, dry, no lesions  : calderon in place      Data:     Recent Results (from the past 24 hour(s))   TYPE & SCREEN    Collection Time: 17  6:30 PM   Result Value Ref Range    Crossmatch Expiration 2017     ABO/Rh(D) [place] POSITIVE     Antibody screen NEG    ASPIRIN TEST    Collection Time: 17  6:30 PM   Result Value Ref Range    Aspirin test [facility]   CRP, HIGH SENSITIVITY    Collection Time: 17  6:30 PM   Result Value Ref Range C-Reactive Protein, Cardiac 1.38 0.00 - 3.00 mg/L   CULTURE, BLOOD    Collection Time: 06/30/17  6:30 PM   Result Value Ref Range    Special Requests: PERIPHERAL      Culture result: NO GROWTH AFTER 19 HOURS     SED RATE (ESR)    Collection Time: 06/30/17  6:30 PM   Result Value Ref Range    Sed rate, automated 10 0 - 30 mm/hr   LACTIC ACID    Collection Time: 06/30/17  6:30 PM   Result Value Ref Range    Lactic acid 2.0 0.4 - 2.0 MMOL/L   CARDIAC PANEL,(CK, CKMB & TROPONIN)    Collection Time: 06/30/17  6:30 PM   Result Value Ref Range     26 - 192 U/L    CK - MB 4.8 (H) <3.6 ng/ml    CK-MB Index 3.3 0.0 - 4.0 %    Troponin-I, Qt. 0.89 (H) 0.0 - 7.603 NG/ML   METABOLIC PANEL, BASIC    Collection Time: 06/30/17  6:30 PM   Result Value Ref Range    Sodium 129 (L) 136 - 145 mmol/L    Potassium 2.5 (LL) 3.5 - 5.5 mmol/L    Chloride 92 (L) 100 - 108 mmol/L    CO2 26 21 - 32 mmol/L    Anion gap 11 3.0 - 18 mmol/L    Glucose 225 (H) 74 - 99 mg/dL    BUN 25 (H) 7.0 - 18 MG/DL    Creatinine 1.35 (H) 0.6 - 1.3 MG/DL    BUN/Creatinine ratio 19 12 - 20      GFR est AA 49 (L) >60 ml/min/1.73m2    GFR est non-AA 41 (L) >60 ml/min/1.73m2    Calcium 7.9 (L) 8.5 - 10.1 MG/DL   MAGNESIUM    Collection Time: 06/30/17  6:30 PM   Result Value Ref Range    Magnesium 1.8 1.6 - 2.6 mg/dL   PHOSPHORUS    Collection Time: 06/30/17  6:30 PM   Result Value Ref Range    Phosphorus 2.5 2.5 - 4.9 MG/DL   LIPID PANEL    Collection Time: 06/30/17  6:30 PM   Result Value Ref Range    LIPID PROFILE          Cholesterol, total 286 (H) <200 MG/DL    Triglyceride 91 <150 MG/DL    HDL Cholesterol 87 (H) 40 - 60 MG/DL    LDL, calculated 180.8 (H) 0 - 100 MG/DL    VLDL, calculated 18.2 MG/DL    CHOL/HDL Ratio 3.3 0 - 5.0     HEMOGLOBIN A1C WITH EAG    Collection Time: 06/30/17  6:30 PM   Result Value Ref Range    Hemoglobin A1c 5.7 (H) 4.2 - 5.6 %    Est. average glucose 117 mg/dL   CORTISOL    Collection Time: 06/30/17  6:30 PM   Result Value Ref Range    Cortisol, random 20.5 3.09 - 22.40 ug/dL   CULTURE, URINE    Collection Time: 06/30/17  7:50 PM   Result Value Ref Range    Special Requests: NO SPECIAL REQUESTS      Culture result: NO GROWTH AFTER 14 HOURS     URINALYSIS W/ RFLX MICROSCOPIC    Collection Time: 06/30/17  9:40 PM   Result Value Ref Range    Color YELLOW      Appearance CLEAR      Specific gravity 1.025 1.005 - 1.030      pH (UA) 8.5 (H) 5.0 - 8.0      Protein NEGATIVE  NEG mg/dL    Glucose NEGATIVE  NEG mg/dL    Ketone NEGATIVE  NEG mg/dL    Bilirubin NEGATIVE  NEG      Blood SMALL (A) NEG      Urobilinogen 0.2 0.2 - 1.0 EU/dL    Nitrites NEGATIVE  NEG      Leukocyte Esterase NEGATIVE  NEG     URINE MICROSCOPIC ONLY    Collection Time: 06/30/17  9:40 PM   Result Value Ref Range    WBC 0 to 1 0 - 4 /hpf    RBC 0 to 3 0 - 5 /hpf    Epithelial cells NEGATIVE  0 - 5 /lpf    Bacteria NEGATIVE  NEG /hpf   POC G3    Collection Time: 06/30/17 10:16 PM   Result Value Ref Range    Device: VENT      FIO2 (POC) 70 %    pH (POC) 7.498 (H) 7.35 - 7.45      pCO2 (POC) 37.3 35.0 - 45.0 MMHG    pO2 (POC) 353 (H) 80 - 100 MMHG    HCO3 (POC) 30.1 (H) 22 - 26 MMOL/L    sO2 (POC) 100 (H) 92 - 97 %    Base excess (POC) 6 mmol/L    Mode SIMV      Tidal volume 500 ml    Set Rate 10 bpm    PEEP/CPAP (POC) 5 cmH2O    Pressure support 10 cmH2O    Allens test (POC) N/A      Inspiratory Time 1.0 sec    Nitric-ppm (POC) 0 ppm    Total resp. rate 10      Site DRAWN FROM ARTERIAL LINE      Patient temp.  32.90      Specimen type (POC) ARTERIAL      Performed by Moise Lee     Volume control plus YES     GLUCOSE, POC    Collection Time: 06/30/17 11:24 PM   Result Value Ref Range    Glucose (POC) 121 (H) 70 - 110 mg/dL   EKG, 12 LEAD, SUBSEQUENT    Collection Time: 06/30/17 11:25 PM   Result Value Ref Range    Ventricular Rate 73 BPM    Atrial Rate 73 BPM    P-R Interval 166 ms    QRS Duration 98 ms    Q-T Interval 572 ms    QTC Calculation (Bezet) 630 ms    Calculated P Axis 72 degrees    Calculated R Axis 64 degrees    Calculated T Axis 98 degrees    Diagnosis       Normal sinus rhythm  Biatrial enlargement  Left ventricular hypertrophy with repolarization abnormality  Prolonged QT  Abnormal ECG  When compared with ECG of 30-JUN-2017 16:53,  Significant changes have occurred  Confirmed by Marlon Mcclure MD, Raghavendra Grijalva ([id number]) on 7/1/2017 10:27:25 AM     CBC W/O DIFF    Collection Time: 07/01/17  3:18 AM   Result Value Ref Range    WBC 14.6 (H) 4.6 - 13.2 K/uL    RBC 5.53 (H) 4.20 - 5.30 M/uL    HGB 14.3 12.0 - 16.0 g/dL    HCT 41.0 35.0 - 45.0 %    MCV 74.1 74.0 - 97.0 FL    MCH 25.9 24.0 - 34.0 PG    MCHC 34.9 31.0 - 37.0 g/dL    RDW 14.7 (H) 11.6 - 14.5 %    PLATELET 446 440 - 438 K/uL    MPV 10.8 9.2 - 11.8 FL   PROTHROMBIN TIME + INR    Collection Time: 07/01/17  3:18 AM   Result Value Ref Range    Prothrombin time 12.1 11.5 - 15.2 sec    INR 0.9 0.8 - 1.2     PTT    Collection Time: 07/01/17  3:18 AM   Result Value Ref Range    aPTT 31.6 23.0 - 51.4 SEC   METABOLIC PANEL, BASIC    Collection Time: 07/01/17  3:18 AM   Result Value Ref Range    Sodium 141 136 - 145 mmol/L    Potassium 4.0 3.5 - 5.5 mmol/L    Chloride 104 100 - 108 mmol/L    CO2 26 21 - 32 mmol/L    Anion gap 11 3.0 - 18 mmol/L    Glucose 168 (H) 74 - 99 mg/dL    BUN 24 (H) 7.0 - 18 MG/DL    Creatinine 1.31 (H) 0.6 - 1.3 MG/DL    BUN/Creatinine ratio 18 12 - 20      GFR est AA 51 (L) >60 ml/min/1.73m2    GFR est non-AA 42 (L) >60 ml/min/1.73m2    Calcium 8.8 8.5 - 10.1 MG/DL   CALCIUM, IONIZED    Collection Time: 07/01/17  3:18 AM   Result Value Ref Range    Ionized Calcium 0.98 (L) 1.12 - 1.32 MMOL/L   MAGNESIUM    Collection Time: 07/01/17  3:18 AM   Result Value Ref Range    Magnesium 2.7 (H) 1.6 - 2.6 mg/dL   PHOSPHORUS    Collection Time: 07/01/17  3:18 AM   Result Value Ref Range    Phosphorus 3.0 2.5 - 4.9 MG/DL   CARDIAC PANEL,(CK, CKMB & TROPONIN)    Collection Time: 07/01/17  3:18 AM   Result Value Ref Range  (H) 26 - 192 U/L    CK - MB 24.6 (H) <3.6 ng/ml    CK-MB Index 7.7 (H) 0.0 - 4.0 %    Troponin-I, Qt. 4.37 (HH) 0.0 - 0.045 NG/ML   LACTIC ACID    Collection Time: 07/01/17  3:18 AM   Result Value Ref Range    Lactic acid 2.7 (HH) 0.4 - 2.0 MMOL/L   CELL COUNT, CSF    Collection Time: 07/01/17  3:28 AM   Result Value Ref Range    CSF TUBE NO. BAGGED SPECIMEN     CSF COLOR COLORLESS      CSF APPEARANCE CLEAR      CSF RBCS 130 (H) 0 /cu mm    CSF WBCS 1 <5 /cu mm   CULTURE, CSF W GRAM STAIN    Collection Time: 07/01/17  3:28 AM   Result Value Ref Range    Special Requests: BAGGED SPECIMEN     GRAM STAIN NO WBC'S SEEN      GRAM STAIN NO ORGANISMS SEEN      Culture result: PENDING    PROTEIN, CSF    Collection Time: 07/01/17  3:28 AM   Result Value Ref Range    Tube No. 1      Protein, (H) 15 - 45 MG/DL   GLUCOSE, CSF    Collection Time: 07/01/17  3:28 AM   Result Value Ref Range    Tube No. BAGGED SPECIMEN     Glucose,CSF 23 (L) 40 - 70 MG/DL   GLUCOSE, POC    Collection Time: 07/01/17  5:38 AM   Result Value Ref Range    Glucose (POC) 157 (H) 70 - 110 mg/dL   LACTIC ACID    Collection Time: 07/01/17  8:24 AM   Result Value Ref Range    Lactic acid 3.3 (HH) 0.4 - 2.0 MMOL/L   EKG, 12 LEAD, SUBSEQUENT    Collection Time: 07/01/17  9:06 AM   Result Value Ref Range    Ventricular Rate 83 BPM    Atrial Rate 83 BPM    P-R Interval 158 ms    QRS Duration 88 ms    Q-T Interval 506 ms    QTC Calculation (Bezet) 594 ms    Calculated P Axis 36 degrees    Calculated R Axis 78 degrees    Calculated T Axis 171 degrees    Diagnosis       Normal sinus rhythm  Possible Left atrial enlargement  Left ventricular hypertrophy with QRS widening and repolarization abnormality  Prolonged QT  Abnormal ECG  When compared with ECG of 30-JUN-2017 23:25,  ST now depressed in Inferior leads  ST now depressed in Lateral leads  T wave inversion now evident in Inferior leads  Confirmed by Mika Huff MD, Jilda Res ([id number]) on 7/1/2017 10:30:24 AM     POTASSIUM    Collection Time: 07/01/17 11:00 AM   Result Value Ref Range    Potassium 3.8 3.5 - 5.5 mmol/L   CARDIAC PANEL,(CK, CKMB & TROPONIN)    Collection Time: 07/01/17 11:00 AM   Result Value Ref Range     (H) 26 - 192 U/L    CK - MB 18.7 (H) <3.6 ng/ml    CK-MB Index 7.4 (H) 0.0 - 4.0 %    Troponin-I, Qt. 16.10 (HH) 0.0 - 0.045 NG/ML   LACTIC ACID    Collection Time: 07/01/17 12:57 PM   Result Value Ref Range    Lactic acid 1.5 0.4 - 2.0 MMOL/L   GLUCOSE, POC    Collection Time: 07/01/17  1:57 PM   Result Value Ref Range    Glucose (POC) 142 (H) 70 - 110 mg/dL             Telemetry:sinus bradycardia    Imaging:  I have personally reviewed the patients radiographs and have reviewed the reports:  7/1/17 CXR  1. Lines and tubes including new PICC line in good position. 2. No acute cardiopulmonary disease.     7/1/17 CT head  1. Interval increased size of left basal ganglia and thalamic hematoma since  initial presenting CT, with increased intraventricular extension, increased  findings of obstructive hydrocephalus, with increased midline shift to the  right. These worsening findings were noted in the patient's chart by Dr. Jennifer Welch.     2. Right frontal ventriculostomy catheter in place with small extra-axial  Pneumocephalus. 7/1/17 ECHO  Left ventricle: The cavity was small. Systolic function was at the lower  limits of normal by EF (biplane method of disks). Ejection fraction was  estimated to be 50 %. No obvious wall motion abnormalities identified in  the views obtained. Wall thickness was moderately to markedly increased. Concentric hypertrophy was present. Features were consistent with a  pseudonormal left ventricular filling pattern, with concomitant abnormal  relaxation and increased filling pressure (grade 2 diastolic dysfunction).           CHRISTIAEN Mccray

## 2017-07-01 NOTE — ROUTINE PROCESS
0700: Bedside shift change report given to Lilibeth Soto RN (oncoming nurse) by Merari Fuentes RN (offgoing nurse). Report included the following information SBAR, Kardex, ED Summary, OR Summary, Procedure Summary, Intake/Output, MAR, Accordion, Recent Results and Med Rec Status. 0745: Echo at bedside. 0800: Emesis, large amt. CPP increased so, labetolol 5mg admin. Zofran also admin. 0940: LA increased, called Dr. Marilu Mcmillan, no orders received at this time, he stated he would review the chart and enter orders. 1000: Dr. Kaila Mahajan called for pt update. Daughter called for update, consent received for PICC line placement. Phone number updated. 1200: PICC nurse at bedside. 1300: Cooling blkt started. PICC placement confirmed with x-ray. 1400: Per verbal order from Dr. Francesco Vega, C/ Señores Curas 88 tube advanced from 50cm to 60cm. 1430: Titrating labetalol drip starting now, will work to titrate cardene off.     1600: Both cardene and labetalol shut off as pt's BP dropped and HR in 50s. Daughter at bedside. 1700: BP has recovered, HR still in 50s. Called to lisette WALTON, [id number]. No new orders at this time. 1720: Dr. Marii Rodriguez note indicated a CT should be done 12hr after this a.m.s although it was not in as an order. I put the order in now, awaiting another person to help transport, have contacted CT tech and respiratory. 1900: Radiologist called to say there was evidence of new stroke and she paged Dr. Jesus Carpio. I also paged Dr. Jesus Carpio, after he called back, no new orders at this time. Bedside shift change report given to Merari Fuentes RN (oncoming nurse) by Lilibeth Soto RN (offgoing nurse). Report included the following information SBAR, Kardex, ED Summary, OR Summary, Procedure Summary, Intake/Output, MAR, Accordion, Recent Results and Med Rec Status.

## 2017-07-01 NOTE — PROGRESS NOTES
Problem: Mobility Impaired (Adult and Pediatric)  Goal: *Acute Goals and Plan of Care (Insert Text)  STG for 7/1/17 to be completed by 7/8/17 (7 days). 1. Pt will complete supine therex PROM/AAROM (B) to [facility] to decrease risk of contractures. 2. Nursing and caregivers will be educated on HEP to BLE to decrease risk of contractures and skin breakdown. Outcome: Progressing Towards Goal  PHYSICAL THERAPY EVALUATION     Patient: Monica Saini (26 y.o. female)  Date: 7/1/2017  Primary Diagnosis: ICH (intracerebral hemorrhage) (HCC)  Precautions: EVD, bedlevel         ASSESSMENT : 55 yo female seen at bedlevel, mechanically intubated with EVD in place, presents with impaired functional mobility, decreased activity tolerance, and generalized weakness s/p ICH. Recommend education when family is present as pt is not verbalizing at present. PROM performed only, LLE in knee immobilizer. Pt did not respond to 3/3 noxious stim to RLE, (-) clonus (B). No active movement noted throughout body during session. Pt will benefit from functional maintenance program for supine therex in order to maintain current ROM and strength and decrease risk of contractures. Rehab tech previously educated on therex to be completed as appropriate. Will progress to educate nursing and caregivers with HEP as appropriate. Recommend continued therapy during acute stay. Eval Complexity: History: MEDIUM  Complexity : 1-2 comorbidities / personal factors will impact the outcome/ POC Exam:LOW Complexity : 1-2 Standardized tests and measures addressing body structure, function, activity limitation and / or participation in recreation  Presentation: LOW Complexity : Stable, uncomplicated  Clinical Decision Making:Low Complexity ICH, cocaine use, not responding Overall Complexity:LOW      Patient will benefit from skilled intervention to address the above impairments.   Patients rehabilitation potential is considered to be Guarded  Factors which may influence rehabilitation potential include:   [ ]         None noted  [ ]         Mental ability/status  [X]         Medical condition  [ ]         Home/family situation and support systems  [ ]         Safety awareness  [ ]         Pain tolerance/management  [ ]         Other:        PLAN :Recommend continued therapy during acute stay. Recommendations and Planned Interventions:  [X]           Bed Mobility Training             [X]    Neuromuscular Re-Education  [X]           Transfer Training                   [ ]    Orthotic/Prosthetic Training  [X]           Gait Training                          [ ]    Modalities  [X]           Therapeutic Exercises          [ ]    Edema Management/Control  [X]           Therapeutic Activities            [X]    Patient and Family Training/Education  [ ]           Other (comment):     Frequency/Duration: Patient will be followed by physical therapy 3-5 times a week by rehab tech for FMP, 1-2x/wk by PT to address goals. Discharge Recommendations: Ric Martin  Further Equipment Recommendations for Discharge: To be determined at next level of care prior to home. SUBJECTIVE:   Patient did not verbalize. OBJECTIVE DATA SUMMARY:       Past Medical History:   Diagnosis Date    Crack cocaine use      Hypertension       Past Surgical History:   Procedure Laterality Date    HX CSF SHUNT Right 06/30/2017     EVD right frontal     Barriers to Learning/Limitations: yes;  altered mental status (i.e.Sedation, Confusion)  Compensate with: visual, verbal, tactile, kinesthetic cues/model  GCODES(GP):Mobility  Current  CN= 100%   Goal  CM= 80-99%. The severity rating is based on the [facility] Sitting Balance Scale  0: Pt performs 25% or less of sitting activity (Max assist) CN, 100% impaired. 1: Pt supports self with upper extremities but requires therapist assistance. Pt performs 25-50% of effort (Mod assist) CM, 80% to <100% impaired.   1+: Pt supports self with upper extremities but requires therapist assistance. Pt performs >50% effort. (Min assist). CL, 60% to <80% impaired. 2: Pt supports self independently with both upper extremities. CL, 60% to <80% impaired. 2+: Pt support self independently with 1 upper extremity. CK, 40% to <60% impaired. 3: Pt sits without upper extremity support for up to 30 seconds. CK, 40% to <60% impaired. 3+: Pt sits without upper extremity support for 30 seconds or greater. CJ, 20% to <40% impaired. 4: Pt moves and returns trunkal midpoint 1-2 inches in one plane. CJ, 20% to <40% impaired. 4+: Pt moves and returns trunkal midpoint 1-2 inches in multiple planes. CI, 1% to <20% impaired. 5: Pt moves and returns trunkal midpoint in all planes greater than 2 inches. CH, 0% impaired. Prior Level of Function/Home Situation: Pt not interactive, information below taken from previously entered data.   Home Situation  Home Environment: Private residence  One/Two Story Residence: One story  Living Alone: No  Support Systems: Child(brandee)  Patient Expects to be Discharged to[de-identified] Private residence  Current DME Used/Available at Home: None  Critical Behavior:  Neurologic State: Unresponsive  Orientation Level: Unable to verbalize  Cognition: Unable to assess (comment)  Psychosocial  Patient Behaviors: Not interactive  Purposeful Interaction: No (comment)  Skin Condition/Temp: Warm  Skin Integrity: Incision (comment)  Skin Integumentary  Skin Color: Appropriate for ethnicity  Skin Condition/Temp: Warm  Skin Integrity: Incision (comment)  Turgor: Non-tenting  Hair Growth: Present  Varicosities: Absent  Strength:    Strength: Grossly decreased, non-functional (no AROM noted BLE)  Tone & Sensation:   Sensation:  (pt did not withdrawal to noxious stim (R) 3/3)  Range Of Motion:  AROM: Grossly decreased, non-functional (no AROM noted BLE)  PROM: Generally decreased, functional (RLE assessed only, LLE in immobilizer)  Functional Mobility:  Bed Mobility:  Rolling: Total assistance  Supine to Sit:  (did not assess)  Balance:   Sitting:  (did not assess)  Standing:  (did not assess)  Ambulation/Gait Training:  Gait Description (WDL):  (did not assess)  Therapeutic Exercises:   PROM (B) DF x 15  PROM (R) hip abduction, hip/knee flexion x 10  Pain:  Pre-treatment level: pt did not verbalize  Location: n/a  Post-treatment level: did not verbalize  Activity Tolerance:   Poor  Please refer to the flowsheet for vital signs taken during this treatment. After treatment:   [ ]         Patient left in no apparent distress sitting up in chair  [X]         Patient left in no apparent distress in bed  [X]         Call bell left within reach  [X]         Nursing notified  [ ]         Caregiver present  [ ]         Bed alarm activated      COMMUNICATION/EDUCATION:   [X]         Fall prevention education was provided and the patient/caregiver indicated understanding. [X]         Patient/family have participated as able in goal setting and plan of care. [X]         Patient/family agree to work toward stated goals and plan of care. [ ]         Patient understands intent and goals of therapy, but is neutral about his/her participation. [ ]         Patient is unable to participate in goal setting and plan of care.      Thank you for this referral.  Connor Villarreal PT, DPT   Time Calculation: 8 mins

## 2017-07-01 NOTE — CONSULTS
Neurology Consultation    Patient: Yue Tineo MRN: [id number]  SSN: [id number]    YOB: 1960  Age: 56 y.o. Sex: female      Subjective:      57 y/o female brought to the [facility] ER yesterday AM after being found unresponsive by friends, covered in vomitus. CT of the head showed a 3.5 x 3.3 cm left thalamic hemorrhage with intra ventricular extension. In the ER, pupils were unequal (L>R) and neither were reactive. The patient had extensor posturing just prior to intubation. The patient was transferred to the care of Dr. Alex Quiroz here at [facility] ER notes. Dr. Alex Quiroz performed a right frontal ventriculostomy yesterday evening, and a post procedure CTA showed no evidence of an underlying aneurysm. The patient has a long history of crack cocaine use per her children, as well as a h/o HTN. There apparently was a question of seizure activity at some point. I cannot find the nursing note documenting this, however. The patient's current nurse has seen no such activity, and was not notified of any when she took over the patient's care. Dr. Amy Macario note of today at [address] [place] [address] mentions possible seizure in his assessment/plan, but I can find no description of any seizure like activity in anyone's notes. No anti epileptic drug has been started. CT of the head done this AM showed an increase in size of the left thalamic hematoma with increased intraventricular extension and increased obstructive hydrocephalus, with increased midline shift to the right. Dr. Alex Quiroz noted all of these findings in his note this AM and adjusted the drain pressure accordingly. Labs: WBC 14.6, plts 261, ESR 10, UA negative, INR 0.9, PTT 31.6, BUN 24, creat 1.31, lactate 3.3 - 1.5,  - 253, troponin 4.37 - 16.10, Mg 1.8 - 2.7, PO4 2.5 - 3.0, UDS positive for cocaine. Aspirin test non therapeutic range. CRP 1.38. Blood, urine and CSF cultures pending.   CSF WBCs 1, RBCs 130, protein 248, glucose 23.    Echo:  Ef 50%. LVH. VS: Tmax 101.3, /80 - 185/110 mmHg. Per nursing, patient was on propofol for a brief period of time, but has not been on it since 8PM last night. The patient was overbreathing the vent at one point, but has stopped doing so today. She does gag with suctioning. Patient has been unresponsive with unequal pupils since arrival per nurse. Past Medical History:   Diagnosis Date    Crack cocaine use     Hypertension    S/p tubal ligation  Hyperlipidemia    History reviewed. No pertinent family history.   Social History   Substance Use Topics    Smoking status: Current Every Day Smoker     Types: Cigarettes    Smokeless tobacco: Never Used    Alcohol use No      Current Facility-Administered Medications   Medication Dose Route Frequency Provider Last Rate Last Dose    insulin lispro (HUMALOG) injection   SubCUTAneous Q6H Dodie Finn MD   Stopped at 07/01/17 1200    glucose chewable tablet 16 g  4 Tab Oral PRN Dodie Finn MD        glucagon (GLUCAGEN) injection 1 mg  1 mg IntraMUSCular PRN Dodie Finn MD        dextrose (D50W) injection syrg 12.5-25 g  25-50 mL IntraVENous PRN Dodie Finn MD        famotidine (PF) (PEPCID) injection 20 mg  20 mg IntraVENous DAILY Dodie Finn MD   20 mg at 07/01/17 1353    bacitracin 500 unit/gram packet 1 Packet  1 Packet Topical PRN Dodie Finn MD        sodium chloride (NS) flush 10-30 mL  10-30 mL InterCATHeter PRN Dodie Finn MD        sodium chloride (NS) flush 10 mL  10 mL InterCATHeter Q24H Dodie Finn MD   10 mL at 07/01/17 1400    sodium chloride (NS) flush 10 mL  10 mL InterCATHeter PRFABI Finn MD        sodium chloride (NS) flush 10-40 mL  10-40 mL InterCATHeter Dagmar Bills MD   10 mL at 07/01/17 1400    sodium chloride (NS) flush 20 mL  20 mL InterCATHeter Q24H Dodie Finn MD   20 mL at 07/01/17 1400    labetalol (NORMODYNE;TRANDATE) 300 mg in 0.9% sodium chloride 150 mL (2 mg / 1 mL) infusion  0.5-2 mg/min IntraVENous TITRATE Anila Forte, PA 30 mL/hr at 07/01/17 1417 1 mg/min at 07/01/17 1417    potassium chloride 20 mEq in 50 ml IVPB  20 mEq IntraVENous ONCE Josefina Dyer MD        ELECTROLYTE REPLACEMENT PROTOCOL  1 Each Other Tata Marinelli MD   1 Each at 06/30/17 [facility]  1 Each Other Tata Marinelli MD   1 Each at 07/01/17 [facility]  1 Each Other Tata Marinelli MD   1 Each at 07/01/17 [facility]  1 Each Other Anna Cummins MD   1 Each at 06/30/17 2319    chlorhexidine (PERIDEX) 0.12 % mouthwash 15 mL  15 mL Oral Q12H Isabelle Johnson MD   15 mL at 07/01/17 0929    ondansetron (ZOFRAN) injection 1 mg  1 mg IntraVENous Q6H VÍCTOR Johnson MD   1 mg at 07/01/17 0806    labetalol (NORMODYNE;TRANDATE) 20 mg/4 mL (5 mg/mL) injection 5 mg  5 mg IntraVENous Q15MIN VÍCTOR Johnson MD   5 mg at 07/01/17 0384    acetaminophen (TYLENOL) tablet 650 mg  650 mg Oral Q4H VÍCTOR Johnson MD        acetaminophen (TYLENOL) suppository 650 mg  650 mg Rectal Q4H VÍCTOR Johnson MD   650 mg at 07/01/17 1401    senna-docusate (PERICOLACE) 8.6-50 mg per tablet 2 Tab  2 Tab Oral QHS Isabelle Johnson MD   2 Tab at 07/01/17 0001    bisacodyl (DULCOLAX) tablet 5 mg  5 mg Oral DAILY PRFABI Johnson MD        bisacodyl (DULCOLAX) suppository 10 mg  10 mg Rectal DAILY VÍCTOR Johnson MD        albuterol (PROVENTIL VENTOLIN) nebulizer solution 2.5 mg  2.5 mg Nebulization Q4H PRFABI Johnson MD   Stopped at 07/01/17 0806    niCARdipine (CARDENE) 125 mg in 0.9% sodium chloride 250 mL infusion  0-15 mg/hr IntraVENous TITRATE Isabelle Johnson MD 30 mL/hr at 07/01/17 1056 15 mg/hr at 31/20/86 9621    folic acid (FOLVITE) 1 mg, thiamine (B-1) 100 mg in 0.9% sodium chloride 50 mL ivpb   IntraVENous DAILY Isabelle Johnson MD        dextrose 5% - 0.9% NaCl with KCl 40 mEq/L infusion   IntraVENous CONTINUOUS Karri Cornelius MD 20 mL/hr at 07/01/17 1100          No Known Allergies    Review of Systems:  Review of systems not obtained due to patient factors. Objective:     Vitals:    07/01/17 1111 07/01/17 1200 07/01/17 1300 07/01/17 1400   BP:   143/85    Pulse: 91 90 95    Resp: 10 14 16    Temp:  (!) 100.9 °F (38.3 °C) (!) 101.3 °F (38.5 °C) 99.7 °F (37.6 °C)   SpO2: 100% 100% 100%    Weight:       Height:            Physical Exam:      General appearance was that of a thin female, intubated; no spontaneous movement noted. No response to deep pain, vigorous sternal rub or voice. HEENT:  Normocephalic. Heart: Regular rate and rhythm, no murmur. Extremities:  No cyanosis, clubbing. No edema. Brace on left leg. Cranial nerves: pupils round, 2 mm, non reactive to light. Gaze conjugate and mostly deviated to patient's left. Some slight roving eye movements but did not cross midline. No resistance to passive eye opening. No blink to visual threat. Bilateral blink reflexes intact. Per nurse, pt. Gags with suctioning. Motor exam:  No withdrawal to pain. No spontaneous movement. Tone flaccid all 4. Reflexes:  )/4 throughout. Right Babinsk's sign present. Cerebellar:  Could not be testing. Sensory:  No response to deep pain. Gait: Not tested.       Assessment:     Hospital Problems  Date Reviewed: 6/30/2017          Codes Class Noted POA    * (Principal)ICH (intracerebral hemorrhage) ([facility] ICD-10-CM: I61.9  ICD-9-CM: 123  6/30/2017 Yes        IVH (intraventricular hemorrhage) ([facility] ICD-10-CM: I61.5  ICD-9-CM: 270  6/30/2017 Yes        Malignant hypertension requiring acute intensive management ICD-10-CM: I10  ICD-9-CM: 401.0  6/30/2017 Yes        Hydrocephalus ICD-10-CM: G91.9  ICD-9-CM: 331.4  6/30/2017 Yes        Cerebral edema ([facility] ICD-10-CM: G93.6  ICD-9-CM: 348.5  6/30/2017 Yes        Brain compression ([facility] [facility] ICD-10-CM: G93.5  ICD-9-CM: 348.4  6/30/2017 Yes        Respiratory center failure ICD-10-CM: G93.89  ICD-9-CM: 348.89  6/30/2017 Yes        Cocaine abuse ICD-10-CM: F14.10  ICD-9-CM: 305.60  6/30/2017 Yes        EVD right frontal ICD-10-CM: Z98.2  ICD-9-CM: V45.2  6/30/2017 No        LCFV CVL ICD-10-CM: Z78.9  ICD-9-CM: V49.89  6/30/2017 No              Plan:     Left thalamic hemorrhage due to cocaine use, HTN. Hydrocephalus due to hemorrhage. Ventriculostomy performed. Some mention of seizure activity in Dr. Taylor Webb follow up note of today. However, no mention of seizure activity anywhere else. None reported by nursing staff. I saw nothing to suggest seizure activity on the patient's physical exam today. Patient has been off of propofol since 8PM last night, so there should not be any lingering sedative effect from that, given that patient's LFTs are fairly normal.  I do not recommend starting an anti epileptic drug at this time, therefore. Please page me if seizure activity is noted. I appreciate the consultation.     Signed By: Tristan Raymond MD     July 1, 2017

## 2017-07-01 NOTE — PROGRESS NOTES
2100- Assumed care of patient. Pt transferred to CT and back in stable condition. Assessment completed upon return. Pt unresponsive, no movement to any stimulus, eyes not opening, NIHSS 35, +2 L +3 R non reactive pupils, no visual threat, - Train of four, R frontal EVD 15 cm h20 CDI with no CSF drainage at this time, lungs clear on the vent, SR on the monitor, pulses palpable, PIVx3, L rad art line, L fem CVL, SCDs placed to BLE, ab soft non tender, + bowel sounds, calderon draining clear yellow urine. Pt has no signs/symptoms of pain or discomfort noted at this time. Unable to obtain temp from portable thermometer. Pt cool to touch. Esophageal temp probe placed. T 32.8 C at this time. 2215- OGT placed at 50 cm. Awaiting xray for placement verification. 2314- Updated Dr. Rachelle Parikh on pt condition. Advised pt low temp (33 C). No orders received at this time. 2319- + TOF, propofol held at this time. 200- Radiology called with wet read results. ETT above kaz and OGT in the stomach. 0000- Reassessment done. No changes noted. Pt has no signs/symptoms of pain or discomfort noted at this time. CSF drainage clear. 0005- CPP > 70, Cardene gtt started at this time at 2 mg/hr    0029- CPP > 70, Cardene gtt increased to 6 mg/hr    0100- Pt eyes cross midline towards left with occucephalic assessment. 0105- CPP > 70, Cardene gtt increased to 8 mg/hr    7243-3310 Pt transferred to CT and back in stable condition.      8796-   07/01/17 0518   Critical Result Types   Type of Critical Result Laboratory   Critical Lab Result Types   Critical Lab Value Troponin   Troponin Value 4.37   Lactic Acid Value 2.7   Notification Information   Notified By (Name) Yoni Tan    Time of Critical Result Notification 0402   Verbal Readback Provided Yes   Provider Notification   Was Provider Notified Yes   Name of Provider Dr. Robyn Rodríguez Yes   Time Provider Notified 5749   Date Provider Notified 07/01/17   Were Orders Received No     0542- Spoke to Dr. Mary Cuevas. Order received to lower EVD to 10 cm H20     0548- Spoke to Sabiha Newman from [facility], provided pertinent information. 1321- Sliding scale insulin protocol initiated for     0704- CPP > 70, Cardene gtt increased to 12 mg/hr    0730- Bedside and Verbal shift change report given to Flo Mccauley (oncoming nurse) by Nuno Parra. Zackary Choi RN  (offgoing nurse). Report included the following information SBAR, Kardex, ED Summary, Procedure Summary, Intake/Output, MAR, Recent Results, Cardiac Rhythm SR/ST and Alarm Parameters . Gtts verified. Dual NIHSS completed at this time.

## 2017-07-01 NOTE — PROGRESS NOTES
Physical Exam   HENT:   Head:       Skin:        Dual skin assessment performed with Fransisco Hendricks RN

## 2017-07-01 NOTE — CONSULTS
ICU AM  (Multidisciplinary/ Interdisciplinary) Rounds   7/1/2017  Observed patient & discussed case w/ BSRN. EMR reviewed    Temp 100.9  NIHSS 34    CXR report: NACPD; tube near EGJ -- will advance NGT 10 cm    Dr Charmaine Clayton managing the EVD    PLAN:  1. Full note to follow. 2. I am going off duty tomorrow; will consult General Neurology due to question of seizure; not on any AED at present.   3. [facility] has been notified    -chung   pager: [phone]

## 2017-07-01 NOTE — PROGRESS NOTES
Echocardiogram performed at the bedside. Cardiology report to follow.     Hugo Concepcion, [address]   Echo Lab  Lab Extension: [phone]  Pager: [phone]

## 2017-07-01 NOTE — PROGRESS NOTES
Problem: Ventilator Management  Goal: *Adequate oxygenation and ventilation  Pt. Intubated s/p Intracerebral hemmorrhage,      Current ventilator settings VC+SIMV rate-10, VT-500, PEEP-5, FIO2-30%     ABG-6/30/17 2216- pH-7.498, PCo2-37.3, PO2-353, HCo3-30.1, So2-100%     Xray-7/1/2017 0402- Lungs are clear. No effusion or pneumothorax. Heart and pulmonary vascularity are normal. Aorta is mildly tortuous.       Plan: Maintain ventilatory support     Goal: Adequate oxygenation, ventilation

## 2017-07-01 NOTE — H&P
H/P written today by . Admitted for [address] and she is s/p ventriculostomy. Neurovascular involved. Will continue current plan.     Ruth Qureshi

## 2017-07-01 NOTE — PROGRESS NOTES
Problem: Self Care Deficits Care Plan (Adult)  Goal: *Acute Goals and Plan of Care (Insert Text)  Occupational Therapy Goals  Initiated 7/1/2017 within 7 day(s). Pt has EVD, intubated, on bedrest. Appropriate for Functional Maintenance Plan. 1. Patient will perform AAROM to BUEs for maintenance of ROM and strength in preparation for ADLs. 2. Patient will follow commands 50% of the time in preparation for ADLs. 3. Family/nursing will be educated on postioning/AROM of BUEs in preparation for ADLs. Outcome: Progressing Towards Goal  OCCUPATIONAL THERAPY EVALUATION     Patient: Yue Tineo (4 y.o. female)  Date: 7/1/2017  Primary Diagnosis: ICH (intracerebral hemorrhage) (HCC)        Precautions:  Fall, Other (comment) (bedrest; EVD)      ASSESSMENT :  Based on the objective data described below, the patient presents with impairments with regard to bed mobility, activity tolerance, BUE function/strength, and independence in ADLs. Pt intubated, not responding to noxious stimuli. No active movement of BUEs. Full PROM of BUEs; skin intact; no edema noted. No family present to educate on positioning/PROM. At this time, pt would benefit from functional maintenance program to maintain/increase UE ROM and strength for ADL tasks. Rehab tech educated on and verbalized appropriate UE exercises for patient. Pt left supine; ROBINA Paredes aware. Patient will benefit from skilled intervention to address the above impairments.   Patients rehabilitation potential is considered to be Fair  Factors which may influence rehabilitation potential include:   [ ]             None noted  [ ]             Mental ability/status  [X]             Medical condition  [ ]             Home/family situation and support systems  [ ]             Safety awareness  [ ]             Pain tolerance/management  [ ]             Other:      Recommendations for nursing: PROM of BUEs to increase AROM/function; 10x; 3x/day          PLAN :  Recommendations and Planned Interventions:  [ ]               Self Care Training                  [ ]        Therapeutic Activities  [ ]               Functional Mobility Training    [ ]        Cognitive Retraining  [X]               Therapeutic Exercises           [ ]        Endurance Activities  [ ]               Balance Training                   [ ]        Neuromuscular Re-Education  [ ]               Visual/Perceptual Training     [ ]   Home Safety Training  [ ]               Patient Education                 [ ]        Family Training/Education  [ ]               Other (comment):     Frequency/Duration: Patient will be followed by rehab tech 3-5 times a week to address goals, weekly by OT. Discharge Recommendations: TBD pending pt's progress  Further Equipment Recommendations for Discharge: TBD       SUBJECTIVE:          OBJECTIVE DATA SUMMARY:       Past Medical History:   Diagnosis Date    Crack cocaine use      Hypertension       Past Surgical History:   Procedure Laterality Date    HX CSF SHUNT Right 06/30/2017     EVD right frontal     Barriers to Learning/Limitations: yes; Other; not responsive to stimuli  Compensate with: visual, verbal, tactile, kinesthetic cues/model     GCODES:  Self Care  Current  CM= 80-99%   Goal  CL= 60-79%. The severity rating is based on the Other Functional Assessment, MMT, ROM     Eval Complexity: History: LOW Complexity : Brief history review ; Examination: LOW Complexity : 1-3 performance deficits relating to physical, cognitive , or psychosocial skils that result in activity limitations and / or participation restrictions ; Decision Making:MEDIUM Complexity : Patient may present with comorbidities that affect occupational performnce.  Miniml to moderate modification of tasks or assistance (eg, physical or verbal ) with assesment(s) is necessary to enable patient to complete evaluation      Prior Level of Function/Home Situation: [address] [facility] Situation  Home Environment: Private residence  One/Two Story Residence: One story  Living Alone: No  Support Systems: Child(brandee)  Patient Expects to be Discharged to[de-identified] Private residence  Current DME Used/Available at Home: None  [ ]  Right hand dominant           [ ]  Left hand dominant (unknown)     Cognitive/Behavioral Status:  Neurologic State: Unresponsive;Eyes open to stimulus  Orientation Level: Unable to verbalize  Cognition: Unable to assess (comment)  Safety/Judgement: Not assessed      Skin: Intact (BUEs)  Edema: None noted (BUEs)     Vision/Perceptual:    Acuity:  (unable to assess)       Coordination:  Fine Motor Skills-Upper: Right Impaired;Left Impaired    Gross Motor Skills-Upper: Right Impaired;Left Impaired      Balance:  Sitting:  (seen at bed level)  Standing:  (seen at bed level)     Strength:  Strength: Grossly decreased, non-functional (no active movement)     Tone & Sensation:  Sensation:  (pt did not withdrawal to noxious stim (R) 3/3)     Range of Motion:  AROM: Grossly decreased, non-functional (BUEs: no active movement)  PROM: Grossly decreased, non-functional (no active movement)     Functional Mobility and Transfers for ADLs:  Bed Mobility:  Rolling: Total assistance  Supine to Sit:  (not assessed)     Transfers:  Sit to Stand:  (not assessed)     ADL Assessment:  Feeding: Total assistance  Oral Facial Hygiene/Grooming: Total assistance  Bathing: Total assistance  Upper Body Dressing: Total assistance  Lower Body Dressing: Total assistance  Toileting: Total assistance     Cognitive Retraining  Safety/Judgement: Not assessed     Pain:  Pre treatment pain level: No pain indicated; did not respond to stimuli  Post treatment pain level: No pain indicated; did not respond to stimuli  Pain Scale 1: Adult Nonverbal Pain Scale  Pain Intensity 1: 0     Activity Tolerance:  Poor  Please refer to the flowsheet for vital signs taken during this treatment.   After treatment:   [ ] Patient left in no apparent distress sitting up in chair  [X] Patient left in no apparent distress in bed  [ ] Call bell left within reach  [X] Nursing notified  [ ] Caregiver present  [ ] Bed alarm activated      COMMUNICATION/EDUCATION: Patient educated on role of OT and POC. No evidence of learning. No family present to educate. [ ] Home safety education was provided and the patient/caregiver indicated understanding. [ ] Patient/family have participated as able in goal setting and plan of care. [ ] Patient/family agree to work toward stated goals and plan of care. [ ] Patient understands intent and goals of therapy, but is neutral about his/her participation. [X] Patient is unable to participate in goal setting and plan of care.      Thank you for this referral.     Sukumar Prieto MS OTR/L  Time Calculation: 8 mins

## 2017-07-01 NOTE — PROGRESS NOTES
Neurovascular Progress Note      Patient: Pablito Murray MRN: [id number]  SSN: [id number]    YOB: 1960  Age: 56 y.o. Sex: female      Events  Started breathing over ventilator set rate, cough and gag indicative of low brainstem function. ICP 10-15 with EVD output 4-5ml/hr. Passive cooling then gradual passive rewarming to euthermia. Blood pressure controlled to maintain CPP 60-70. CT this morning shows symmetric increase of lateral ventricular size. No new hemorrhage. Continue regional mass effect and brain compression from blood packed in third ventricle. Blood packed in aqueduct and fourth ventricle contributing to brainstem compression against clivus in addition to downward forces from hydrocephalus. EVD lowered to 10cm.     Vital Signs  Patient Vitals for the past 24 hrs:   Temp Pulse Resp BP SpO2   07/01/17 0600 98.4 °F (36.9 °C) 95 12 138/90 100 %   07/01/17 0500 97.3 °F (36.3 °C) (!) 103 12 132/86 100 %   07/01/17 0400 97 °F (36.1 °C) 89 13 130/89 100 %   07/01/17 0318 - 89 13 - 100 %   07/01/17 0300 95.9 °F (35.5 °C) 85 13 127/84 100 %   07/01/17 0200 95.2 °F (35.1 °C) 83 12 134/83 100 %   07/01/17 0100 (!) 94.3 °F (34.6 °C) 75 10 142/86 100 %   07/01/17 0030 - 74 11 (!) 185/110 100 %   07/01/17 0024 - 78 10 - 100 %   07/01/17 0000 (!) 93.4 °F (34.1 °C) 71 10 (!) 160/94 100 %   06/30/17 2300 (!) 92.3 °F (33.5 °C) 77 10 126/82 100 %   06/30/17 2220 - 74 12 - 100 %   06/30/17 2200 (!) 91 °F (32.8 °C) 73 12 122/80 100 %   06/30/17 2133 - 68 12 - 100 %   06/30/17 2100 - 72 12 141/73 100 %   06/30/17 2039 - 75 10 155/76 100 %       NIHSS Flow Sheet  Total: 34 (07/01/17 0600)     Vent  Vent Settings  FIO2 (%): 30 %  SIMV Rate Set: 10  Back-Up Rate: 10  Vt Set (ml): 500 ml  Pressure Support (cm H2O): 10 cm H2O  PEEP/VENT (cm H2O): 5 cm H20  I:E Ratio: 1:5.67  Insp Time (sec): 1 sec  Insp Rise Time %: 50 %  Flow Trigger: 3      Intake and Output    Intake/Output Summary (Last 24 hours) at 07/01/17 0703  Last data filed at 07/01/17 0600   Gross per 24 hour   Intake          1000.86 ml   Output             1698 ml   Net          -697.14 ml       Data    Recent Results (from the past 24 hour(s))   CBC WITH AUTOMATED DIFF    Collection Time: 06/30/17  3:20 PM   Result Value Ref Range    WBC 13.6 (H) 4.6 - 13.2 K/uL    RBC 5.69 (H) 4.20 - 5.30 M/uL    HGB 14.6 12.0 - 16.0 g/dL    HCT 42.2 35.0 - 45.0 %    MCV 74.2 74.0 - 97.0 FL    MCH 25.7 24.0 - 34.0 PG    MCHC 34.6 31.0 - 37.0 g/dL    RDW 14.4 11.6 - 14.5 %    PLATELET 582 452 - 618 K/uL    MPV 10.8 9.2 - 11.8 FL    NEUTROPHILS 80 (H) 40 - 73 %    LYMPHOCYTES 15 (L) 21 - 52 %    MONOCYTES 4 3 - 10 %    EOSINOPHILS 1 0 - 5 %    BASOPHILS 0 0 - 2 %    ABS. NEUTROPHILS 11.0 (H) 1.8 - 8.0 K/UL    ABS. LYMPHOCYTES 2.0 0.9 - 3.6 K/UL    ABS. MONOCYTES 0.5 0.05 - 1.2 K/UL    ABS. EOSINOPHILS 0.1 0.0 - 0.4 K/UL    ABS. BASOPHILS 0.0 0.0 - 0.1 K/UL    DF AUTOMATED      PLATELET COMMENTS ADEQUATE PLATELETS      RBC COMMENTS ANISOCYTOSIS  1+       METABOLIC PANEL, COMPREHENSIVE    Collection Time: 06/30/17  3:20 PM   Result Value Ref Range    Sodium 137 136 - 145 mmol/L    Potassium 2.6 (LL) 3.5 - 5.5 mmol/L    Chloride 98 (L) 100 - 108 mmol/L    CO2 28 21 - 32 mmol/L    Anion gap 11 3.0 - 18 mmol/L    Glucose 219 (H) 74 - 99 mg/dL    BUN 25 (H) 7.0 - 18 MG/DL    Creatinine 1.28 0.6 - 1.3 MG/DL    BUN/Creatinine ratio 20 12 - 20      GFR est AA 52 (L) >60 ml/min/1.73m2    GFR est non-AA 43 (L) >60 ml/min/1.73m2    Calcium 9.2 8.5 - 10.1 MG/DL    Bilirubin, total 0.5 0.2 - 1.0 MG/DL    ALT (SGPT) 39 13 - 56 U/L    AST (SGOT) 22 15 - 37 U/L    Alk.  phosphatase 133 (H) 45 - 117 U/L    Protein, total 8.3 (H) 6.4 - 8.2 g/dL    Albumin 4.1 3.4 - 5.0 g/dL    Globulin 4.2 (H) 2.0 - 4.0 g/dL    A-G Ratio 1.0 0.8 - 1.7     PROTHROMBIN TIME + INR    Collection Time: 06/30/17  3:20 PM   Result Value Ref Range    Prothrombin time 11.6 11.5 - 15.2 sec    INR 0.9 0.8 - 1.2 MAGNESIUM    Collection Time: 06/30/17  3:20 PM   Result Value Ref Range    Magnesium 2.1 1.6 - 2.6 mg/dL   DRUG SCREEN, URINE    Collection Time: 06/30/17  4:00 PM   Result Value Ref Range    BENZODIAZEPINE NEGATIVE  NEG      BARBITURATES NEGATIVE  NEG      THC (TH-CANNABINOL) NEGATIVE  NEG      OPIATES NEGATIVE  NEG      PCP(PHENCYCLIDINE) NEGATIVE  NEG      COCAINE POSITIVE (A) NEG      AMPHETAMINE NEGATIVE  NEG      METHADONE NEGATIVE  NEG      HDSCOM (NOTE)    POC G3    Collection Time: 06/30/17  4:24 PM   Result Value Ref Range    Device: VENT      FIO2 (POC) 60 %    pH (POC) 7.356 7.35 - 7.45      pCO2 (POC) 57.0 (H) 35.0 - 45.0 MMHG    pO2 (POC) 262 (H) 80 - 100 MMHG    HCO3 (POC) 32.1 (H) 22 - 26 MMOL/L    sO2 (POC) 100 (H) 92 - 97 %    Base excess (POC) 4 mmol/L    Mode ASSIST CONTROL      Tidal volume 450 ml    Set Rate 18 bpm    PEEP/CPAP (POC) 6 cmH2O    PIP (POC) 21      Allens test (POC) N/A      Inspiratory Time 0.9 sec    Total resp.  rate 18      Site RIGHT BRACHIAL      Patient temp. 97.6      Specimen type (POC) ARTERIAL      Performed by Bhavna Drea     Volume control plus YES     EKG, 12 LEAD, INITIAL    Collection Time: 06/30/17  4:53 PM   Result Value Ref Range    Ventricular Rate 84 BPM    Atrial Rate 84 BPM    P-R Interval 152 ms    QRS Duration 102 ms    Q-T Interval 454 ms    QTC Calculation (Bezet) 536 ms    Calculated P Axis 78 degrees    Calculated R Axis 70 degrees    Calculated T Axis 87 degrees    Diagnosis       Normal sinus rhythm  Biatrial enlargement  Left ventricular hypertrophy with repolarization abnormality  Prolonged QT  Abnormal ECG  No previous ECGs available     TYPE & SCREEN    Collection Time: 06/30/17  6:30 PM   Result Value Ref Range    Crossmatch Expiration 07/03/2017     ABO/Rh(D) [place] POSITIVE     Antibody screen NEG    ASPIRIN TEST    Collection Time: 06/30/17  6:30 PM   Result Value Ref Range    Aspirin test 557 ARU   CULTURE, BLOOD    Collection Time: 06/30/17 6:30 PM   Result Value Ref Range    Special Requests: PERIPHERAL      Culture result: PENDING    SED RATE (ESR)    Collection Time: 06/30/17  6:30 PM   Result Value Ref Range    Sed rate, automated 10 0 - 30 mm/hr   LACTIC ACID    Collection Time: 06/30/17  6:30 PM   Result Value Ref Range    Lactic acid 2.0 0.4 - 2.0 MMOL/L   CARDIAC PANEL,(CK, CKMB & TROPONIN)    Collection Time: 06/30/17  6:30 PM   Result Value Ref Range     26 - 192 U/L    CK - MB 4.8 (H) <3.6 ng/ml    CK-MB Index 3.3 0.0 - 4.0 %    Troponin-I, Qt. 0.89 (H) 0.0 - 7.753 NG/ML   METABOLIC PANEL, BASIC    Collection Time: 06/30/17  6:30 PM   Result Value Ref Range    Sodium 129 (L) 136 - 145 mmol/L    Potassium 2.5 (LL) 3.5 - 5.5 mmol/L    Chloride 92 (L) 100 - 108 mmol/L    CO2 26 21 - 32 mmol/L    Anion gap 11 3.0 - 18 mmol/L    Glucose 225 (H) 74 - 99 mg/dL    BUN 25 (H) 7.0 - 18 MG/DL    Creatinine 1.35 (H) 0.6 - 1.3 MG/DL    BUN/Creatinine ratio 19 12 - 20      GFR est AA 49 (L) >60 ml/min/1.73m2    GFR est non-AA 41 (L) >60 ml/min/1.73m2    Calcium 7.9 (L) 8.5 - 10.1 MG/DL   MAGNESIUM    Collection Time: 06/30/17  6:30 PM   Result Value Ref Range    Magnesium 1.8 1.6 - 2.6 mg/dL   PHOSPHORUS    Collection Time: 06/30/17  6:30 PM   Result Value Ref Range    Phosphorus 2.5 2.5 - 4.9 MG/DL   LIPID PANEL    Collection Time: 06/30/17  6:30 PM   Result Value Ref Range    LIPID PROFILE          Cholesterol, total 286 (H) <200 MG/DL    Triglyceride 91 <150 MG/DL    HDL Cholesterol 87 (H) 40 - 60 MG/DL    LDL, calculated 180.8 (H) 0 - 100 MG/DL    VLDL, calculated 18.2 MG/DL    CHOL/HDL Ratio 3.3 0 - 5.0     HEMOGLOBIN A1C WITH EAG    Collection Time: 06/30/17  6:30 PM   Result Value Ref Range    Hemoglobin A1c 5.7 (H) 4.2 - 5.6 %    Est. average glucose 117 mg/dL   CORTISOL    Collection Time: 06/30/17  6:30 PM   Result Value Ref Range    Cortisol, random 20.5 3.09 - 22.40 ug/dL   URINALYSIS W/ RFLX MICROSCOPIC    Collection Time: 06/30/17 9:40 PM   Result Value Ref Range    Color YELLOW      Appearance CLEAR      Specific gravity 1.025 1.005 - 1.030      pH (UA) 8.5 (H) 5.0 - 8.0      Protein NEGATIVE  NEG mg/dL    Glucose NEGATIVE  NEG mg/dL    Ketone NEGATIVE  NEG mg/dL    Bilirubin NEGATIVE  NEG      Blood SMALL (A) NEG      Urobilinogen 0.2 0.2 - 1.0 EU/dL    Nitrites NEGATIVE  NEG      Leukocyte Esterase NEGATIVE  NEG     URINE MICROSCOPIC ONLY    Collection Time: 06/30/17  9:40 PM   Result Value Ref Range    WBC 0 to 1 0 - 4 /hpf    RBC 0 to 3 0 - 5 /hpf    Epithelial cells NEGATIVE  0 - 5 /lpf    Bacteria NEGATIVE  NEG /hpf   POC G3    Collection Time: 06/30/17 10:16 PM   Result Value Ref Range    Device: VENT      FIO2 (POC) 70 %    pH (POC) 7.498 (H) 7.35 - 7.45      pCO2 (POC) 37.3 35.0 - 45.0 MMHG    pO2 (POC) 353 (H) 80 - 100 MMHG    HCO3 (POC) 30.1 (H) 22 - 26 MMOL/L    sO2 (POC) 100 (H) 92 - 97 %    Base excess (POC) 6 mmol/L    Mode SIMV      Tidal volume 500 ml    Set Rate 10 bpm    PEEP/CPAP (POC) 5 cmH2O    Pressure support 10 cmH2O    Allens test (POC) N/A      Inspiratory Time 1.0 sec    Nitric-ppm (POC) 0 ppm    Total resp. rate 10      Site DRAWN FROM ARTERIAL LINE      Patient temp.  32.90      Specimen type (POC) ARTERIAL      Performed by Gerda Argueta     Volume control plus YES     GLUCOSE, POC    Collection Time: 06/30/17 11:24 PM   Result Value Ref Range    Glucose (POC) 121 (H) 70 - 110 mg/dL   EKG, 12 LEAD, SUBSEQUENT    Collection Time: 06/30/17 11:25 PM   Result Value Ref Range    Ventricular Rate 73 BPM    Atrial Rate 73 BPM    P-R Interval 166 ms    QRS Duration 98 ms    Q-T Interval 572 ms    QTC Calculation (Bezet) 630 ms    Calculated P Axis 72 degrees    Calculated R Axis 64 degrees    Calculated T Axis 98 degrees    Diagnosis       Normal sinus rhythm  Biatrial enlargement  Left ventricular hypertrophy with repolarization abnormality  Prolonged QT  Abnormal ECG  When compared with ECG of 30-JUN-2017 16:53,  Significant changes have occurred     CBC W/O DIFF    Collection Time: 07/01/17  3:18 AM   Result Value Ref Range    WBC 14.6 (H) 4.6 - 13.2 K/uL    RBC 5.53 (H) 4.20 - 5.30 M/uL    HGB 14.3 12.0 - 16.0 g/dL    HCT 41.0 35.0 - 45.0 %    MCV 74.1 74.0 - 97.0 FL    MCH 25.9 24.0 - 34.0 PG    MCHC 34.9 31.0 - 37.0 g/dL    RDW 14.7 (H) 11.6 - 14.5 %    PLATELET 613 459 - 114 K/uL    MPV 10.8 9.2 - 11.8 FL   PROTHROMBIN TIME + INR    Collection Time: 07/01/17  3:18 AM   Result Value Ref Range    Prothrombin time 12.1 11.5 - 15.2 sec    INR 0.9 0.8 - 1.2     PTT    Collection Time: 07/01/17  3:18 AM   Result Value Ref Range    aPTT 31.6 23.0 - 17.1 SEC   METABOLIC PANEL, BASIC    Collection Time: 07/01/17  3:18 AM   Result Value Ref Range    Sodium 141 136 - 145 mmol/L    Potassium 4.0 3.5 - 5.5 mmol/L    Chloride 104 100 - 108 mmol/L    CO2 26 21 - 32 mmol/L    Anion gap 11 3.0 - 18 mmol/L    Glucose 168 (H) 74 - 99 mg/dL    BUN 24 (H) 7.0 - 18 MG/DL    Creatinine 1.31 (H) 0.6 - 1.3 MG/DL    BUN/Creatinine ratio 18 12 - 20      GFR est AA 51 (L) >60 ml/min/1.73m2    GFR est non-AA 42 (L) >60 ml/min/1.73m2    Calcium 8.8 8.5 - 10.1 MG/DL   CALCIUM, IONIZED    Collection Time: 07/01/17  3:18 AM   Result Value Ref Range    Ionized Calcium 0.98 (L) 1.12 - 1.32 MMOL/L   MAGNESIUM    Collection Time: 07/01/17  3:18 AM   Result Value Ref Range    Magnesium 2.7 (H) 1.6 - 2.6 mg/dL   PHOSPHORUS    Collection Time: 07/01/17  3:18 AM   Result Value Ref Range    Phosphorus 3.0 2.5 - 4.9 MG/DL   CARDIAC PANEL,(CK, CKMB & TROPONIN)    Collection Time: 07/01/17  3:18 AM   Result Value Ref Range     (H) 26 - 192 U/L    CK - MB 24.6 (H) <3.6 ng/ml    CK-MB Index 7.7 (H) 0.0 - 4.0 %    Troponin-I, Qt. 4.37 (HH) 0.0 - 0.045 NG/ML   LACTIC ACID    Collection Time: 07/01/17  3:18 AM   Result Value Ref Range    Lactic acid 2.7 (HH) 0.4 - 2.0 MMOL/L   GLUCOSE, POC    Collection Time: 07/01/17  5:38 AM   Result Value Ref Range Glucose (POC) 157 (H) 70 - 110 mg/dL      Assessment/Plan  56year-old female who is 12 hours s/p right frontal ventricular for left thalamic ICH with intraventricular extension resulting in obstructive hydrocephalus in the setting of acute on chronic cocaine use.     Her ICP overnight was in normal range with moderate CSF output but lateral ventricles continued to enlarge symmetrically so EVD lowered. Maintain CPP 60-70. Follow neuro exam.  If not improved repeat CT in 12 hours. If ventricles not decompressing will consider lowering EVD further being mindful that left to right shift could occur from asymmetric drainage. Remainder of treatment including toxic/metabolic derangements, cerebral edema, possible seizure, cardiopulmonary issues, etc per medical teams.  Please call the neurovascular serviice if any questions about EVD management.     Grzegorz Nunez MD

## 2017-07-01 NOTE — ROUTINE PROCESS
Unable to obtain consent. Daughter's cell number with unknown named person per voice message. Pt's home number disconnected. Central access working.

## 2017-07-01 NOTE — H&P
History and Physical    Patient: Kenya Levi               Sex: female          DOA: 6/30/2017       YOB: 1960      Age:  56 y.o.        LOS:  LOS: 1 day        HPI:     Kenya Levi is a 56 y.o. female who presents to the ED after found unconscious. Patient has no known medical history, per her family. She was found down at a friends home. She was brought in to [facility] ED via EMS. While in the ED, imaging showed ICH. Blood pressure was also severely elevated. Patient transferred to [facility] for further management. Past Medical History:   Diagnosis Date    Crack cocaine use     Hypertension        Past Surgical History:   Procedure Laterality Date    HX CSF SHUNT Right 06/30/2017    EVD right frontal       Social History     Social History    Marital status:      Spouse name: N/A    Number of children: N/A    Years of education: N/A     Occupational History    Not on file. Social History Main Topics    Smoking status: Current Every Day Smoker     Types: Cigarettes    Smokeless tobacco: Never Used    Alcohol use No    Drug use: Yes     Special: Cocaine    Sexual activity: Not on file     Other Topics Concern    Not on file     Social History Narrative    No narrative on file       History reviewed. No pertinent family history. No Known Allergies    Review of Systems:  Positive in bold. Unable to obtain. Physical Exam:      Visit Vitals    /86    Pulse 75    Temp (!) 94.3 °F (34.6 °C)    Resp 10    Ht 5' 5\" (1.651 m)    Wt 46.8 kg (103 lb 2.8 oz)    SpO2 100%    Breastfeeding No    BMI 17.17 kg/m2       Physical Exam:  Gen:  Intubated. Does not respond to stimulus. Cough and gag present  HEENT:  S/P ventriculostomy with evd right frontal. Pupils non reactive, equal.  Neck:  Supple, No JVD  Lungs:  Clear bilaterally, breath sounds equal, no wheeze, no rales.   Cardiovascular:  Regular Rate and Rhythm, normal S1 and S2, no audible murmur. Capillary refill: < 3 seconds. Abdomen:  Soft, non distended, normal bowel sounds, no guarding. Extremities:  Well perfused, no cyanosis, no edema   Peripheral pulse:2+  Neurological:  Unresponsive, intubated. Skin:  No rashes or moles. Turgor and color normal  Mental Status:  Unable to obtain      Laboratory Studies: All lab results for the last 24 hours reviewed. Assessment/Plan     Principal Problem:    ICH (intracerebral hemorrhage) ([address] (6/30/2017)    Active Problems:    IVH (intraventricular hemorrhage) ([address] (6/30/2017)      Malignant hypertension requiring acute intensive management (6/30/2017)      Hydrocephalus (6/30/2017)      Cerebral edema (HCC) (6/30/2017)      Brain compression ([address] (6/30/2017)      Respiratory center failure (6/30/2017)      Cocaine abuse (6/30/2017)      Hypokalemia (6/30/2017)      EVD right frontal (6/30/2017)      LCFV CVL (6/30/2017)        PLAN:  55 yo female admitted for cerebral hemorrhage, s/p ventriculostomy with evd right frontal.   Patient currently intubated, non responsive. Continue on vent    see neuro orders. CV: HTN. On cardene   Monitor vitals as per unit   EKG in am   Heme:  DVT prophylaxis - Bilateral lower extremity compression devices   Neurovascular check     FULL CODE   Monitor intake and output   Monitor vitals as per unit   Replace electrolytes as needed. Follow up am labs.           Mirta Adler MD

## 2017-07-01 NOTE — PROGRESS NOTES
0800-Received patient on ventilator. VC+SIMV rate-10, VT-500, PEEP-5, FIO2-30%. O2 Sats-100% HR-116, RR-15. Pt. Noted to have an episode of emesis. RN aware. ETT noted to be patent and secure. Respiratory will continue to monitor. TX held at this time due to tachycardia and emesis. [id number]       -1550-Pt. Remains on above settings. O2 Sats-100% HR-60, RR-10. ETT remains patent. No SBT this shift as pt.  Is Ventriolostomy placement, febrile, and emesis this am.

## 2017-07-01 NOTE — BRIEF OP NOTE
[id number] [address] BRIEF OPERATIVE NOTE    [place]    Date of Procedure:  6/30/2017    Surgeon:  Farhan Palacio MD    Preoperative Diagnosis:  ICH/IVH, hydrocephalus    Postoperative Diagnosis:  same     Procedure:  right frontal ventriculostomy, left common femoral central venous access    Anesthesia:  geta, local         Specimens:  none    Implants:  none    Estimated Blood Loss:  minimal    Findings:      Right frontal EVD in adequate position in frontal horn of right lateral ventricle revealing intracranial pressure 20cm CSF. LCFV CVL in adequate position.      Complications:  none immediate

## 2017-07-01 NOTE — PROGRESS NOTES
Speech Therapy Note:    On-call SLP acknowledging eval & tx orders; will be in this afternoon to address.      Thank you,  Neena Michel M.S. [facility] SLP  Ph: [phone]

## 2017-07-02 NOTE — PROGRESS NOTES
Neurovascular Progress Note      Patient: Young Villatoro MRN: [id number]  SSN: [id number]    YOB: 1960  Age: 56 y.o. Sex: female      Events  No signs of brainstem function for staff with additional CSF diversion after EVD lowered this morning. CT after 12 hours shows decrease in the size of the right lateral ventricle but not the left lateral ventricle with a little more right to left midline shift. Hemorrhage remains similar after initial enlargement. Interval right cerebral hemisphere loss of gray white distinction.     Vital Signs  Patient Vitals for the past 24 hrs:   Temp Pulse Resp BP SpO2   07/02/17 0001 - 94 10 - 100 %   07/02/17 0000 (!) 94.1 °F (34.5 °C) (!) 110 10 135/88 100 %   07/01/17 2330 - (!) 114 11 (!) 161/95 100 %   07/01/17 2300 96.3 °F (35.7 °C) (!) 122 13 (!) 168/96 100 %   07/01/17 2200 98.1 °F (36.7 °C) (!) 103 20 148/88 100 %   07/01/17 2100 97.7 °F (36.5 °C) 97 20 130/80 100 %   07/01/17 2000 97.3 °F (36.3 °C) (!) 118 10 142/88 100 %   07/01/17 1948 - (!) 118 10 - 100 %   07/01/17 1900 - 85 13 - 100 %   07/01/17 1730 - (!) 56 10 105/72 98 %   07/01/17 1700 - (!) 56 10 104/65 100 %   07/01/17 1600 98.1 °F (36.7 °C) (!) 57 10 92/61 100 %   07/01/17 1555 - (!) 57 10 - 100 %   07/01/17 1500 97.7 °F (36.5 °C) 64 10 108/70 100 %   07/01/17 1400 99.7 °F (37.6 °C) 90 11 145/81 100 %   07/01/17 1300 (!) 101.3 °F (38.5 °C) 95 16 143/85 100 %   07/01/17 1200 (!) 100.9 °F (38.3 °C) 90 14 - 100 %   07/01/17 1111 - 91 10 - 100 %   07/01/17 1100 (!) 100.6 °F (38.1 °C) 83 10 122/73 100 %   07/01/17 1000 (!) 100.6 °F (38.1 °C) 84 11 133/75 100 %   07/01/17 0930 (!) 100.6 °F (38.1 °C) - - 131/82 -   07/01/17 0900 (!) 100.9 °F (38.3 °C) 81 13 118/80 100 %   07/01/17 0808 - (!) 115 21 - 100 %   07/01/17 0805 - - - (!) 171/112 -   07/01/17 0800 99.9 °F (37.7 °C) (!) 104 17 (!) 168/115 100 %   07/01/17 0700 99.3 °F (37.4 °C) (!) 105 13 (!) 156/95 100 %   07/01/17 0600 98.4 °F (36.9 °C) 95 12 138/90 100 %   07/01/17 0500 97.3 °F (36.3 °C) (!) 103 12 132/86 100 %   07/01/17 0400 97 °F (36.1 °C) 89 13 130/89 100 %   07/01/17 0318 - 89 13 - 100 %   07/01/17 0300 95.9 °F (35.5 °C) 85 13 127/84 100 %   07/01/17 0200 95.2 °F (35.1 °C) 83 12 134/83 100 %   07/01/17 0100 (!) 94.3 °F (34.6 °C) 75 10 142/86 100 %   07/01/17 0030 - 74 11 (!) 185/110 100 %   07/01/17 0024 - 78 10 - 100 %   ICP 8-10 most of the day then more recently 12-15    NIHSS Flow Sheet  Total: 32 (07/02/17 0000)     Vent  Vent Settings  FIO2 (%): 30 %  CMV Rate Set: 10  SIMV Rate Set: 10  Back-Up Rate: 10  Vt Set (ml): 500 ml  Pressure Support (cm H2O): 10 cm H2O  PEEP/VENT (cm H2O): 5 cm H20  I:E Ratio: 1:5.67  Insp Time (sec): 2 sec  Insp Rise Time %: 50 %  Flow Trigger: 3  Expiratory Sensitivity: 25 %      Intake and Output    Intake/Output Summary (Last 24 hours) at 07/02/17 0010  Last data filed at 07/02/17 0000   Gross per 24 hour   Intake          1160.73 ml   Output             1930 ml   Net          -769.27 ml   CSF 147ml today EVD 10cm open    Data    Recent Results (from the past 24 hour(s))   CBC W/O DIFF    Collection Time: 07/01/17  3:18 AM   Result Value Ref Range    WBC 14.6 (H) 4.6 - 13.2 K/uL    RBC 5.53 (H) 4.20 - 5.30 M/uL    HGB 14.3 12.0 - 16.0 g/dL    HCT 41.0 35.0 - 45.0 %    MCV 74.1 74.0 - 97.0 FL    MCH 25.9 24.0 - 34.0 PG    MCHC 34.9 31.0 - 37.0 g/dL    RDW 14.7 (H) 11.6 - 14.5 %    PLATELET 530 706 - 218 K/uL    MPV 10.8 9.2 - 11.8 FL   PROTHROMBIN TIME + INR    Collection Time: 07/01/17  3:18 AM   Result Value Ref Range    Prothrombin time 12.1 11.5 - 15.2 sec    INR 0.9 0.8 - 1.2     PTT    Collection Time: 07/01/17  3:18 AM   Result Value Ref Range    aPTT 31.6 23.0 - 20.0 SEC   METABOLIC PANEL, BASIC    Collection Time: 07/01/17  3:18 AM   Result Value Ref Range    Sodium 141 136 - 145 mmol/L    Potassium 4.0 3.5 - 5.5 mmol/L    Chloride 104 100 - 108 mmol/L    CO2 26 21 - 32 mmol/L    Anion gap 11 3.0 - 18 mmol/L    Glucose 168 (H) 74 - 99 mg/dL    BUN 24 (H) 7.0 - 18 MG/DL    Creatinine 1.31 (H) 0.6 - 1.3 MG/DL    BUN/Creatinine ratio 18 12 - 20      GFR est AA 51 (L) >60 ml/min/1.73m2    GFR est non-AA 42 (L) >60 ml/min/1.73m2    Calcium 8.8 8.5 - 10.1 MG/DL   CALCIUM, IONIZED    Collection Time: 07/01/17  3:18 AM   Result Value Ref Range    Ionized Calcium 0.98 (L) 1.12 - 1.32 MMOL/L   MAGNESIUM    Collection Time: 07/01/17  3:18 AM   Result Value Ref Range    Magnesium 2.7 (H) 1.6 - 2.6 mg/dL   PHOSPHORUS    Collection Time: 07/01/17  3:18 AM   Result Value Ref Range    Phosphorus 3.0 2.5 - 4.9 MG/DL   CARDIAC PANEL,(CK, CKMB & TROPONIN)    Collection Time: 07/01/17  3:18 AM   Result Value Ref Range     (H) 26 - 192 U/L    CK - MB 24.6 (H) <3.6 ng/ml    CK-MB Index 7.7 (H) 0.0 - 4.0 %    Troponin-I, Qt. 4.37 (HH) 0.0 - 0.045 NG/ML   LACTIC ACID    Collection Time: 07/01/17  3:18 AM   Result Value Ref Range    Lactic acid 2.7 (HH) 0.4 - 2.0 MMOL/L   CELL COUNT, CSF    Collection Time: 07/01/17  3:28 AM   Result Value Ref Range    CSF TUBE NO. BAGGED SPECIMEN     CSF COLOR COLORLESS      CSF APPEARANCE CLEAR      CSF RBCS 130 (H) 0 /cu mm    CSF WBCS 1 <5 /cu mm   CULTURE, CSF W GRAM STAIN    Collection Time: 07/01/17  3:28 AM   Result Value Ref Range    Special Requests: BAGGED SPECIMEN     GRAM STAIN NO WBC'S SEEN      GRAM STAIN NO ORGANISMS SEEN      Culture result: PENDING    PROTEIN, CSF    Collection Time: 07/01/17  3:28 AM   Result Value Ref Range    Tube No. 1      Protein, (H) 15 - 45 MG/DL   GLUCOSE, CSF    Collection Time: 07/01/17  3:28 AM   Result Value Ref Range    Tube No. BAGGED SPECIMEN     Glucose,CSF 23 (L) 40 - 70 MG/DL   GLUCOSE, POC    Collection Time: 07/01/17  5:38 AM   Result Value Ref Range    Glucose (POC) 157 (H) 70 - 110 mg/dL   LACTIC ACID    Collection Time: 07/01/17  8:24 AM   Result Value Ref Range    Lactic acid 3.3 (HH) 0.4 - 2.0 MMOL/L   EKG, 12 LEAD, SUBSEQUENT Collection Time: 07/01/17  9:06 AM   Result Value Ref Range    Ventricular Rate 83 BPM    Atrial Rate 83 BPM    P-R Interval 158 ms    QRS Duration 88 ms    Q-T Interval 506 ms    QTC Calculation (Bezet) 594 ms    Calculated P Axis 36 degrees    Calculated R Axis 78 degrees    Calculated T Axis 171 degrees    Diagnosis       Normal sinus rhythm  Possible Left atrial enlargement  Left ventricular hypertrophy with QRS widening and repolarization abnormality  Prolonged QT  Abnormal ECG  When compared with ECG of 30-JUN-2017 23:25,  ST now depressed in Inferior leads  ST now depressed in Lateral leads  T wave inversion now evident in Inferior leads  Confirmed by Tata Montoya MD, Dana Boston ([id number]) on 7/1/2017 10:30:24 AM     POTASSIUM    Collection Time: 07/01/17 11:00 AM   Result Value Ref Range    Potassium 3.8 3.5 - 5.5 mmol/L   CARDIAC PANEL,(CK, CKMB & TROPONIN)    Collection Time: 07/01/17 11:00 AM   Result Value Ref Range     (H) 26 - 192 U/L    CK - MB 18.7 (H) <3.6 ng/ml    CK-MB Index 7.4 (H) 0.0 - 4.0 %    Troponin-I, Qt. 16.10 (HH) 0.0 - 0.045 NG/ML   LACTIC ACID    Collection Time: 07/01/17 12:57 PM   Result Value Ref Range    Lactic acid 1.5 0.4 - 2.0 MMOL/L   GLUCOSE, POC    Collection Time: 07/01/17  1:57 PM   Result Value Ref Range    Glucose (POC) 142 (H) 70 - 110 mg/dL   LACTIC ACID    Collection Time: 07/01/17  5:10 PM   Result Value Ref Range    Lactic acid 2.2 (HH) 0.4 - 2.0 MMOL/L   TSH 3RD GENERATION    Collection Time: 07/01/17  5:10 PM   Result Value Ref Range    TSH 0.32 (L) 0.36 - 3.74 uIU/mL   CARDIAC PANEL,(CK, CKMB & TROPONIN)    Collection Time: 07/01/17  7:00 PM   Result Value Ref Range     (H) 26 - 192 U/L    CK - MB 11.0 (H) <3.6 ng/ml    CK-MB Index 5.3 (H) 0.0 - 4.0 %    Troponin-I, Qt. 22.60 (HH) 0.0 - 0.045 NG/ML   POTASSIUM    Collection Time: 07/01/17  7:00 PM   Result Value Ref Range    Potassium 4.2 3.5 - 5.5 mmol/L   GLUCOSE, POC    Collection Time: 07/01/17  7:38 PM Result Value Ref Range    Glucose (POC) 121 (H) 70 - 110 mg/dL   LACTIC ACID    Collection Time: 07/01/17  8:45 PM   Result Value Ref Range    Lactic acid 1.5 0.4 - 2.0 MMOL/L   GLUCOSE, POC    Collection Time: 07/02/17 12:06 AM   Result Value Ref Range    Glucose (POC) 135 (H) 70 - 110 mg/dL      Physical Exam  Supine, HOB 30 degrees. No breathing when ventilator suspended but she suddenly reached toward her face with her left arm/hand toward her face then her right arm/hand raised partially as well. These movements were reproduced by nasal tickle and stimulation over the supraorbital notch. 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 The movements of her arms to stimulation of the head was not expected given serial neuro checks by staff throughout the day. These should not be reflex movements as the body was not being stimulated and the left arm moved toward the face and not simply flexion. The right cerebral hemisphere ischemic infarct will likely worsen cerebral edema with resultant mass effect and brain compression. Hypertonic protocol could be considered. Will continue CSF diversion at current EVD setting of 10cm as there is concern that continued asymmetric ventricle decompression will continue to increase left to right shift. 40 minutes of neurocritical care provided independent of procedures.     Fidencio Lopez MD

## 2017-07-02 NOTE — PROGRESS NOTES
-0815-Received patient on ventilator VC+ SIMV rate-10, VT-500, Peep-5, FIO2-30%. O2 Sats-100% HR-72, RR-10. ETT noted to be patent and secure. Respiratory will continue to monitor. -[facility]    --0920-ABG drawn. Results below and reported to MD. RR increased to 16bpm per MD to assist with better management with ICP.-[facility]    Results for José Miguel Vega (MRN [id number]) as of 7/2/2017 10:49   Ref. Range 7/2/2017 09:19   pH (POC) Latest Ref Range: 7.35 - 7.45   7.414   pCO2 (POC) Latest Ref Range: 35.0 - 45.0 MMHG 39.9   pO2 (POC) Latest Ref Range: 80 - 100 MMHG 155 (H)   HCO3 (POC) Latest Ref Range: 22 - 26 MMOL/L 25.7   sO2 (POC) Latest Ref Range: 92 - 97 % 99 (H)   Base excess (POC) Latest Units: mmol/L 1   FIO2 (POC) Latest Units: % 30   Patient temp. Latest Units:   36.0   Specimen type (POC) Latest Units:   ARTERIAL   Set Rate Latest Units: bpm 10   Site Latest Units:   DRAWN FROM ARTERI. .. Device: Latest Units:   VENT   Total resp. rate Latest Units:   10   Mode Latest Units:   SIMV   Tidal volume Latest Units: ml 500   Volume control plus Latest Units:   YES   PEEP/CPAP (POC) Latest Units: cmH2O 5   Allens test (POC) Latest Units:   N/A       -1055-Repeat ABG drawn and reported to MD. Results below. No additional changes ordered. [facility]    Results for José Miguel Vega (MRN [id number]) as of 7/2/2017 11:41   Ref. Range 7/2/2017 10:50   pH (POC) Latest Ref Range: 7.35 - 7.45   7.481 (H)   pCO2 (POC) Latest Ref Range: 35.0 - 45.0 MMHG 28.9 (L)   pO2 (POC) Latest Ref Range: 80 - 100 MMHG 159 (H)   HCO3 (POC) Latest Ref Range: 22 - 26 MMOL/L 21.7 (L)   sO2 (POC) Latest Ref Range: 92 - 97 % 100 (H)   Base deficit (POC) Latest Units: mmol/L 2   FIO2 (POC) Latest Units: % 30   Patient temp. Latest Units:   36.4   Specimen type (POC) Latest Units:   ARTERIAL   Set Rate Latest Units: bpm 16   Site Latest Units:   DRAWN FROM ARTERI. .. Device: Latest Units:   VENT   Total resp.  rate Latest Units:   16   Mode Latest Units: SIMV   Tidal volume Latest Units: ml 500   Volume control plus Latest Units:   YES   PEEP/CPAP (POC) Latest Units: cmH2O 5   Pressure support Latest Units: cmH2O 10   Allens test (POC) Latest Units:   N/A       -1620-Pt. Remains on ventilator VC+SIMV rate-16, VT-500, PEEP-5, FIO2-30%. O2 Sats-100% HR-89, RR-16. ETT remains patent and secure.  -[address]

## 2017-07-02 NOTE — ROUTINE PROCESS
1539:  Rosaline Jones notified that patient is rapidly deteriorating and medical team is approaching family about imminent death and withdraw of care. 1549: Received return phone call from [facility]. Coordinator en-route to [facility] at this time. Should family decide to withdraw care prior to coordinator arrival, nursing should call [facility] and they will talk with family via phone.

## 2017-07-02 NOTE — PROGRESS NOTES
1900- Assumed care of pt. Report received by Madi Fox. Dual NIHSS completed at this time. 2000- Assessment complete. Pt unresponsive, no movement to any stimulus, eyes not opening, NIHSS 34, +2 jerson non reactive pupils, deviating upwards + for nystagmus, no visual threat, R frontal EVD 10 cm h20 CDI with serous CSF drainage at this time, lungs clear on the vent, ST with depressed T wave on the monitor, pulses palpable, PIVx2, L rad art line, L fem CVL, R upper PICC, SCDs placed to BLE, ab soft non tender, + bowel sounds, calderon draining clear yellow urine. Pt has no signs/symptoms of pain or discomfort noted at this time. 2030- L femoral CVL removed, pressure applied until hemostasis achieved. 2058- Critical lab called to RN, troponin 22.6. Dr. Kaila Ramirez notified, no orders received. 2216- PRN labetalol given    2230- LLE immobilizer removed at this time    2302- CPP > 70, labetalol gtt started at 0.5 mg/min    2324- CPP > 70, labetalol gtt increased to 1 mg/min    2334- CPP > 70, labetalol gtt increased to 1.5 mg/min     0000- Reassessment done. Pt has no signs/symptoms of pain or discomfort noted at this time. Pt with BUE movements when face touched. No withdraw/movement when pressure applied to extremity. NIHSS 32    0037- HR low 60s, labetalol held and cardene started at this time to maintain CPP < 70    0400- Reassessment done. Pt has no signs/symptoms of pain or discomfort noted at this time. No changes noted. 5103- Critical lab called to RN, troponin 23.6. Dr. Kaila Ramirez notified, no orders received. 0715- Bedside and Verbal shift change report given to Madi Fox (oncoming nurse) by Caitie Iqbal. Fidelia Noyola RN  (offgoing nurse). Report included the following information SBAR, Kardex, Intake/Output, MAR, Recent Results, Cardiac Rhythm SR/ST with inverted T wave and depressed T wave and Alarm Parameters . Dual NIHSS completed at this time. Gtts verified.

## 2017-07-02 NOTE — ROUTINE PROCESS
0700: Bedside shift change report given to Lilibeth Soto RN (oncoming nurse) by Merari Fuentes RN (offgoing nurse). Report included the following information SBAR, Kardex, ED Summary, OR Summary, Procedure Summary, Intake/Output, MAR, Accordion, Recent Results and Med Rec Status. 0800: ICP 21, will continue to monitor    0845: BP elevated, ICP 27, Dr. Jesus Carpio paged at [phone].    6457: Dr. Jesus Carpio paged again. 0940: Dr. Jesus Carpio paged again. Call returned, no new orders at this time. Dr. Jesus Carpio asked if the EVD was draining and I stated it was and he responded that as long as the EVD was draining, the medical team should be contacted for further questions regarding her medical management. 1110: ICP 39, rezeroed device and will continue to monitor. 1130: ICP 38, Dr. Andrew Rodarte paged. Stated that she is not the provider that should be contacted to manage ICP    1140: Dr. Huey Freeman notified of ICP, new orders received. 1400: Pt's sister, son, daughter at bedside, updated by Dr. Huey Freeman of condition and prognosis. 1500:  paged to speak to family pertaining new prognosis. 1520: L pupil changed from 2, fixed to 6 fixed (R pupil still 2). Pt's ICP is now 58. Dr. Shay Estrella paged. Cough and gag as well as corneal reflexes bilaterally remain intact    1600: Dr. Shay Estrella, Dr. Martha Goldman, [place], and myself at bedside. Pt's sister, daughter and son are present and were updated as to pt's prognosis and current state. Family agreed to make the pt a DNR. Palliative care consult entered. 1700: Cooling blkt turned on    1900: Bedside shift change report given to Henry Nuñez RN (oncoming nurse) by Lilibeth Soto RN (offgoing nurse). Report included the following information SBAR, Kardex, ED Summary, OR Summary, Procedure Summary, Intake/Output, MAR, Accordion, Recent Results and Med Rec Status.

## 2017-07-02 NOTE — PROGRESS NOTES
Problem: Ventilator Management  Goal: *Adequate oxygenation and ventilation  Pt.  Intubated s/p Intracerebral hemmorrhage,       Current ventilator settings VC+SIMV rate-16, VT-500, PEEP-5, FIO2-30%      ABG-7/2/17 1050- pH-7.481, PCo2-28.9, PO2-159, HCo3-21.7, So2-100%      Xray-7/2/2017 0318- No acute cardiopulmonary disease       Plan: Maintain ventilatory support      Goal: Adequate oxygenation, ventilation

## 2017-07-02 NOTE — PROGRESS NOTES
Progress Note    Patient: Yue Tineo MRN: [id number]  SSN: [id number]    YOB: 1960  Age: 56 y.o. Sex: female      Admit Date: 6/30/2017    LOS: 2 days     Subjective:     CCM team called me: The patient's ICP has gone from 37 to 57, the patient's systemic BP is 22/180 mmHg. The patient has deteriorated clinically in that the left pupil is dilated and unresponsive. Sociu if 150, serum osmol is 330, so hypertonic saline and mannitol are not options. She has been hyperventilated. Contacted Dr. Alex Quiroz but he insists that she is not his patient despite the fact that he placed an intraventricular drain in the patient. Patient is not yet DNR. No seizure activity reported by nursing since admission. No seizure activity observed by myself. The patient's family is present. Last CT of the head was done  Yesterday and showed spread of blood into all ventricles. Objective:     Vitals:    07/02/17 1215 07/02/17 1300 07/02/17 1400 07/02/17 1500   BP:  (!) 174/91 (!) 177/93 (!) 204/99   Pulse: 71 72 68 71   Resp: 16 16 16 16   Temp:       SpO2: 100% 100% 100% 100%   Weight:       Height:            Physical Exam:     Right pupil 2 mm, round, NR, left pupil round 6 mm, NR. No resistance to passive eye opening, no blink to visual threat. Gaze fixed at midline. Still gags with suctioning but not overbreathing vent. ICP still at 57. No response to pain. No seizure like activity. Tone flaccid.     Lab/Data Review:  Recent Results (from the past 48 hour(s))   DRUG SCREEN, URINE    Collection Time: 06/30/17  4:00 PM   Result Value Ref Range    BENZODIAZEPINE NEGATIVE  NEG      BARBITURATES NEGATIVE  NEG      THC (TH-CANNABINOL) NEGATIVE  NEG      OPIATES NEGATIVE  NEG      PCP(PHENCYCLIDINE) NEGATIVE  NEG      COCAINE POSITIVE (A) NEG      AMPHETAMINE NEGATIVE  NEG      METHADONE NEGATIVE  NEG      HDSCOM (NOTE)    POC G3    Collection Time: 06/30/17  4:24 PM   Result Value Ref Range    Device: VENT      FIO2 (POC) 60 %    pH (POC) 7.356 7.35 - 7.45      pCO2 (POC) 57.0 (H) 35.0 - 45.0 MMHG    pO2 (POC) 262 (H) 80 - 100 MMHG    HCO3 (POC) 32.1 (H) 22 - 26 MMOL/L    sO2 (POC) 100 (H) 92 - 97 %    Base excess (POC) 4 mmol/L    Mode ASSIST CONTROL      Tidal volume 450 ml    Set Rate 18 bpm    PEEP/CPAP (POC) 6 cmH2O    PIP (POC) 21      Allens test (POC) N/A      Inspiratory Time 0.9 sec    Total resp.  rate 18      Site RIGHT BRACHIAL      Patient temp. 97.6      Specimen type (POC) ARTERIAL      Performed by Moose Arroyo     Volume control plus YES     EKG, 12 LEAD, INITIAL    Collection Time: 06/30/17  4:53 PM   Result Value Ref Range    Ventricular Rate 84 BPM    Atrial Rate 84 BPM    P-R Interval 152 ms    QRS Duration 102 ms    Q-T Interval 454 ms    QTC Calculation (Bezet) 536 ms    Calculated P Axis 78 degrees    Calculated R Axis 70 degrees    Calculated T Axis 87 degrees    Diagnosis       Normal sinus rhythm  Biatrial enlargement  Left ventricular hypertrophy with repolarization abnormality  Prolonged QT  Abnormal ECG  No previous ECGs available  Confirmed by Chloe Henderson MD, ----- ([id number]) on 7/1/2017 5:18:17 PM     TYPE & SCREEN    Collection Time: 06/30/17  6:30 PM   Result Value Ref Range    Crossmatch Expiration 07/03/2017     ABO/Rh(D) Betsy Killer POSITIVE     Antibody screen NEG    ASPIRIN TEST    Collection Time: 06/30/17  6:30 PM   Result Value Ref Range    Aspirin test 557 [facility]U   CRP, HIGH SENSITIVITY    Collection Time: 06/30/17  6:30 PM   Result Value Ref Range    C-Reactive Protein, Cardiac 1.38 0.00 - 3.00 mg/L   CULTURE, BLOOD    Collection Time: 06/30/17  6:30 PM   Result Value Ref Range    Special Requests: PERIPHERAL      Culture result: NO GROWTH 2 DAYS     SED RATE (ESR)    Collection Time: 06/30/17  6:30 PM   Result Value Ref Range    Sed rate, automated 10 0 - 30 mm/hr   LACTIC ACID    Collection Time: 06/30/17  6:30 PM   Result Value Ref Range    Lactic acid 2.0 0.4 - 2. 0 MMOL/L   CARDIAC PANEL,(CK, CKMB & TROPONIN)    Collection Time: 06/30/17  6:30 PM   Result Value Ref Range     26 - 192 U/L    CK - MB 4.8 (H) <3.6 ng/ml    CK-MB Index 3.3 0.0 - 4.0 %    Troponin-I, Qt. 0.89 (H) 0.0 - 1.026 NG/ML   METABOLIC PANEL, BASIC    Collection Time: 06/30/17  6:30 PM   Result Value Ref Range    Sodium 129 (L) 136 - 145 mmol/L    Potassium 2.5 (LL) 3.5 - 5.5 mmol/L    Chloride 92 (L) 100 - 108 mmol/L    CO2 26 21 - 32 mmol/L    Anion gap 11 3.0 - 18 mmol/L    Glucose 225 (H) 74 - 99 mg/dL    BUN 25 (H) 7.0 - 18 MG/DL    Creatinine 1.35 (H) 0.6 - 1.3 MG/DL    BUN/Creatinine ratio 19 12 - 20      GFR est AA 49 (L) >60 ml/min/1.73m2    GFR est non-AA 41 (L) >60 ml/min/1.73m2    Calcium 7.9 (L) 8.5 - 10.1 MG/DL   MAGNESIUM    Collection Time: 06/30/17  6:30 PM   Result Value Ref Range    Magnesium 1.8 1.6 - 2.6 mg/dL   PHOSPHORUS    Collection Time: 06/30/17  6:30 PM   Result Value Ref Range    Phosphorus 2.5 2.5 - 4.9 MG/DL   LIPID PANEL    Collection Time: 06/30/17  6:30 PM   Result Value Ref Range    LIPID PROFILE          Cholesterol, total 286 (H) <200 MG/DL    Triglyceride 91 <150 MG/DL    HDL Cholesterol 87 (H) 40 - 60 MG/DL    LDL, calculated 180.8 (H) 0 - 100 MG/DL    VLDL, calculated 18.2 MG/DL    CHOL/HDL Ratio 3.3 0 - 5.0     HEMOGLOBIN A1C WITH EAG    Collection Time: 06/30/17  6:30 PM   Result Value Ref Range    Hemoglobin A1c 5.7 (H) 4.2 - 5.6 %    Est. average glucose 117 mg/dL   CORTISOL    Collection Time: 06/30/17  6:30 PM   Result Value Ref Range    Cortisol, random 20.5 3.09 - 22.40 ug/dL   CULTURE, URINE    Collection Time: 06/30/17  7:50 PM   Result Value Ref Range    Special Requests: NO SPECIAL REQUESTS      Culture result: NO GROWTH 2 DAYS     URINALYSIS W/ RFLX MICROSCOPIC    Collection Time: 06/30/17  9:40 PM   Result Value Ref Range    Color YELLOW      Appearance CLEAR      Specific gravity 1.025 1.005 - 1.030      pH (UA) 8.5 (H) 5.0 - 8.0      Protein NEGATIVE  NEG mg/dL    Glucose NEGATIVE  NEG mg/dL    Ketone NEGATIVE  NEG mg/dL    Bilirubin NEGATIVE  NEG      Blood SMALL (A) NEG      Urobilinogen 0.2 0.2 - 1.0 EU/dL    Nitrites NEGATIVE  NEG      Leukocyte Esterase NEGATIVE  NEG     URINE MICROSCOPIC ONLY    Collection Time: 06/30/17  9:40 PM   Result Value Ref Range    WBC 0 to 1 0 - 4 /hpf    RBC 0 to 3 0 - 5 /hpf    Epithelial cells NEGATIVE  0 - 5 /lpf    Bacteria NEGATIVE  NEG /hpf   POC G3    Collection Time: 06/30/17 10:16 PM   Result Value Ref Range    Device: VENT      FIO2 (POC) 70 %    pH (POC) 7.498 (H) 7.35 - 7.45      pCO2 (POC) 37.3 35.0 - 45.0 MMHG    pO2 (POC) 353 (H) 80 - 100 MMHG    HCO3 (POC) 30.1 (H) 22 - 26 MMOL/L    sO2 (POC) 100 (H) 92 - 97 %    Base excess (POC) 6 mmol/L    Mode SIMV      Tidal volume 500 ml    Set Rate 10 bpm    PEEP/CPAP (POC) 5 cmH2O    Pressure support 10 cmH2O    Allens test (POC) N/A      Inspiratory Time 1.0 sec    Nitric-ppm (POC) 0 ppm    Total resp. rate 10      Site DRAWN FROM ARTERIAL LINE      Patient temp.  32.90      Specimen type (POC) ARTERIAL      Performed by Kelly Foster     Volume control plus YES     GLUCOSE, POC    Collection Time: 06/30/17 11:24 PM   Result Value Ref Range    Glucose (POC) 121 (H) 70 - 110 mg/dL   EKG, 12 LEAD, SUBSEQUENT    Collection Time: 06/30/17 11:25 PM   Result Value Ref Range    Ventricular Rate 73 BPM    Atrial Rate 73 BPM    P-R Interval 166 ms    QRS Duration 98 ms    Q-T Interval 572 ms    QTC Calculation (Bezet) 630 ms    Calculated P Axis 72 degrees    Calculated R Axis 64 degrees    Calculated T Axis 98 degrees    Diagnosis       Normal sinus rhythm  Biatrial enlargement  Left ventricular hypertrophy with repolarization abnormality  Prolonged QT  Abnormal ECG  When compared with ECG of 30-JUN-2017 16:53,  Significant changes have occurred  Confirmed by Thierry Templeton MD, Bernardo Hernandez ([id number]) on 7/1/2017 10:27:25 AM     CBC W/O DIFF    Collection Time: 07/01/17  3:18 AM Result Value Ref Range    WBC 14.6 (H) 4.6 - 13.2 K/uL    RBC 5.53 (H) 4.20 - 5.30 M/uL    HGB 14.3 12.0 - 16.0 g/dL    HCT 41.0 35.0 - 45.0 %    MCV 74.1 74.0 - 97.0 FL    MCH 25.9 24.0 - 34.0 PG    MCHC 34.9 31.0 - 37.0 g/dL    RDW 14.7 (H) 11.6 - 14.5 %    PLATELET 163 191 - 569 K/uL    MPV 10.8 9.2 - 11.8 FL   PROTHROMBIN TIME + INR    Collection Time: 07/01/17  3:18 AM   Result Value Ref Range    Prothrombin time 12.1 11.5 - 15.2 sec    INR 0.9 0.8 - 1.2     PTT    Collection Time: 07/01/17  3:18 AM   Result Value Ref Range    aPTT 31.6 23.0 - 86.2 SEC   METABOLIC PANEL, BASIC    Collection Time: 07/01/17  3:18 AM   Result Value Ref Range    Sodium 141 136 - 145 mmol/L    Potassium 4.0 3.5 - 5.5 mmol/L    Chloride 104 100 - 108 mmol/L    CO2 26 21 - 32 mmol/L    Anion gap 11 3.0 - 18 mmol/L    Glucose 168 (H) 74 - 99 mg/dL    BUN 24 (H) 7.0 - 18 MG/DL    Creatinine 1.31 (H) 0.6 - 1.3 MG/DL    BUN/Creatinine ratio 18 12 - 20      GFR est AA 51 (L) >60 ml/min/1.73m2    GFR est non-AA 42 (L) >60 ml/min/1.73m2    Calcium 8.8 8.5 - 10.1 MG/DL   CALCIUM, IONIZED    Collection Time: 07/01/17  3:18 AM   Result Value Ref Range    Ionized Calcium 0.98 (L) 1.12 - 1.32 MMOL/L   MAGNESIUM    Collection Time: 07/01/17  3:18 AM   Result Value Ref Range    Magnesium 2.7 (H) 1.6 - 2.6 mg/dL   PHOSPHORUS    Collection Time: 07/01/17  3:18 AM   Result Value Ref Range    Phosphorus 3.0 2.5 - 4.9 MG/DL   CARDIAC PANEL,(CK, CKMB & TROPONIN)    Collection Time: 07/01/17  3:18 AM   Result Value Ref Range     (H) 26 - 192 U/L    CK - MB 24.6 (H) <3.6 ng/ml    CK-MB Index 7.7 (H) 0.0 - 4.0 %    Troponin-I, Qt. 4.37 (HH) 0.0 - 0.045 NG/ML   LACTIC ACID    Collection Time: 07/01/17  3:18 AM   Result Value Ref Range    Lactic acid 2.7 (HH) 0.4 - 2.0 MMOL/L   CELL COUNT, CSF    Collection Time: 07/01/17  3:28 AM   Result Value Ref Range    CSF TUBE NO. BAGGED SPECIMEN     CSF COLOR COLORLESS      CSF APPEARANCE CLEAR      CSF RBCS 130 (H) 0 /cu mm    CSF WBCS 1 <5 /cu mm   CULTURE, CSF W GRAM STAIN    Collection Time: 07/01/17  3:28 AM   Result Value Ref Range    Special Requests: BAGGED SPECIMEN     GRAM STAIN NO WBC'S SEEN      GRAM STAIN NO ORGANISMS SEEN      Culture result: NO GROWTH AFTER 22 HOURS     PROTEIN, CSF    Collection Time: 07/01/17  3:28 AM   Result Value Ref Range    Tube No. 1      Protein, (H) 15 - 45 MG/DL   GLUCOSE, CSF    Collection Time: 07/01/17  3:28 AM   Result Value Ref Range    Tube No. BAGGED SPECIMEN     Glucose,CSF 23 (L) 40 - 70 MG/DL   GLUCOSE, POC    Collection Time: 07/01/17  5:38 AM   Result Value Ref Range    Glucose (POC) 157 (H) 70 - 110 mg/dL   LACTIC ACID    Collection Time: 07/01/17  8:24 AM   Result Value Ref Range    Lactic acid 3.3 (HH) 0.4 - 2.0 MMOL/L   EKG, 12 LEAD, SUBSEQUENT    Collection Time: 07/01/17  9:06 AM   Result Value Ref Range    Ventricular Rate 83 BPM    Atrial Rate 83 BPM    P-R Interval 158 ms    QRS Duration 88 ms    Q-T Interval 506 ms    QTC Calculation (Bezet) 594 ms    Calculated P Axis 36 degrees    Calculated R Axis 78 degrees    Calculated T Axis 171 degrees    Diagnosis       Normal sinus rhythm  Possible Left atrial enlargement  Left ventricular hypertrophy with QRS widening and repolarization abnormality  Prolonged QT  Abnormal ECG  When compared with ECG of 30-JUN-2017 23:25,  ST now depressed in Inferior leads  ST now depressed in Lateral leads  T wave inversion now evident in Inferior leads  Confirmed by Mary Bojorquez MD, Reilly Altman ([id number]) on 7/1/2017 10:30:24 AM     POTASSIUM    Collection Time: 07/01/17 11:00 AM   Result Value Ref Range    Potassium 3.8 3.5 - 5.5 mmol/L   CARDIAC PANEL,(CK, CKMB & TROPONIN)    Collection Time: 07/01/17 11:00 AM   Result Value Ref Range     (H) 26 - 192 U/L    CK - MB 18.7 (H) <3.6 ng/ml    CK-MB Index 7.4 (H) 0.0 - 4.0 %    Troponin-I, Qt. 16.10 (HH) 0.0 - 0.045 NG/ML   LACTIC ACID    Collection Time: 07/01/17 12:57 PM Result Value Ref Range    Lactic acid 1.5 0.4 - 2.0 MMOL/L   GLUCOSE, POC    Collection Time: 07/01/17  1:57 PM   Result Value Ref Range    Glucose (POC) 142 (H) 70 - 110 mg/dL   LACTIC ACID    Collection Time: 07/01/17  5:10 PM   Result Value Ref Range    Lactic acid 2.2 (HH) 0.4 - 2.0 MMOL/L   TSH 3RD GENERATION    Collection Time: 07/01/17  5:10 PM   Result Value Ref Range    TSH 0.32 (L) 0.36 - 3.74 uIU/mL   CARDIAC PANEL,(CK, CKMB & TROPONIN)    Collection Time: 07/01/17  7:00 PM   Result Value Ref Range     (H) 26 - 192 U/L    CK - MB 11.0 (H) <3.6 ng/ml    CK-MB Index 5.3 (H) 0.0 - 4.0 %    Troponin-I, Qt. 22.60 (HH) 0.0 - 0.045 NG/ML   POTASSIUM    Collection Time: 07/01/17  7:00 PM   Result Value Ref Range    Potassium 4.2 3.5 - 5.5 mmol/L   GLUCOSE, POC    Collection Time: 07/01/17  7:38 PM   Result Value Ref Range    Glucose (POC) 121 (H) 70 - 110 mg/dL   LACTIC ACID    Collection Time: 07/01/17  8:45 PM   Result Value Ref Range    Lactic acid 1.5 0.4 - 2.0 MMOL/L   GLUCOSE, POC    Collection Time: 07/02/17 12:06 AM   Result Value Ref Range    Glucose (POC) 135 (H) 70 - 110 mg/dL   CBC W/O DIFF    Collection Time: 07/02/17  2:59 AM   Result Value Ref Range    WBC 13.7 (H) 4.6 - 13.2 K/uL    RBC 4.76 4.20 - 5.30 M/uL    HGB 12.0 12.0 - 16.0 g/dL    HCT 35.7 35.0 - 45.0 %    MCV 75.0 74.0 - 97.0 FL    MCH 25.2 24.0 - 34.0 PG    MCHC 33.6 31.0 - 37.0 g/dL    RDW 15.1 (H) 11.6 - 14.5 %    PLATELET 443 489 - 851 K/uL    MPV 11.5 9.2 - 11.8 FL   PROTHROMBIN TIME + INR    Collection Time: 07/02/17  2:59 AM   Result Value Ref Range    Prothrombin time 13.7 11.5 - 15.2 sec    INR 1.1 0.8 - 1.2     PTT    Collection Time: 07/02/17  2:59 AM   Result Value Ref Range    aPTT 35.8 23.0 - 57.5 SEC   METABOLIC PANEL, BASIC    Collection Time: 07/02/17  2:59 AM   Result Value Ref Range    Sodium 149 (H) 136 - 145 mmol/L    Potassium 4.8 3.5 - 5.5 mmol/L    Chloride 116 (H) 100 - 108 mmol/L    CO2 23 21 - 32 mmol/L Anion gap 10 3.0 - 18 mmol/L    Glucose 153 (H) 74 - 99 mg/dL    BUN 39 (H) 7.0 - 18 MG/DL    Creatinine 1.74 (H) 0.6 - 1.3 MG/DL    BUN/Creatinine ratio 22 (H) 12 - 20      GFR est AA 37 (L) >60 ml/min/1.73m2    GFR est non-AA 30 (L) >60 ml/min/1.73m2    Calcium 8.4 (L) 8.5 - 10.1 MG/DL   CALCIUM, IONIZED    Collection Time: 07/02/17  2:59 AM   Result Value Ref Range    Ionized Calcium 1.10 (L) 1.12 - 1.32 MMOL/L   MAGNESIUM    Collection Time: 07/02/17  2:59 AM   Result Value Ref Range    Magnesium 2.6 1.6 - 2.6 mg/dL   PHOSPHORUS    Collection Time: 07/02/17  2:59 AM   Result Value Ref Range    Phosphorus 3.7 2.5 - 4.9 MG/DL   CARDIAC PANEL,(CK, CKMB & TROPONIN)    Collection Time: 07/02/17  2:59 AM   Result Value Ref Range     26 - 192 U/L    CK - MB 10.4 (H) <3.6 ng/ml    CK-MB Index 5.8 (H) 0.0 - 4.0 %    Troponin-I, Qt. 23.60 (HH) 0.0 - 0.045 NG/ML   CELL COUNT, CSF    Collection Time: 07/02/17  3:15 AM   Result Value Ref Range    CSF TUBE NO. BAGGED SPECIMEN     CSF COLOR COLORLESS      CSF APPEARANCE BLOODY      CSF RBCS 3600 (H) 0 /cu mm    CSF WBCS 4 <5 /cu mm   PROTEIN, CSF    Collection Time: 07/02/17  3:15 AM   Result Value Ref Range    Tube No. BAGGED SPECIMEN     Protein, (H) 15 - 45 MG/DL   GLUCOSE, CSF    Collection Time: 07/02/17  3:15 AM   Result Value Ref Range    Tube No. BAGGED SPECIMEN     Glucose,CSF 77 (H) 40 - 70 MG/DL   CULTURE, CSF W GRAM STAIN    Collection Time: 07/02/17  3:15 AM   Result Value Ref Range    Special Requests: BAGGED SPECIMEN     GRAM STAIN RARE  WBC'S        GRAM STAIN NO ORGANISMS SEEN      Culture result: PENDING    GLUCOSE, POC    Collection Time: 07/02/17  6:09 AM   Result Value Ref Range    Glucose (POC) 175 (H) 70 - 110 mg/dL   CARDIAC PANEL,(CK, CKMB & TROPONIN)    Collection Time: 07/02/17  8:03 AM   Result Value Ref Range     26 - 192 U/L    CK - MB 10.7 (H) <3.6 ng/ml    CK-MB Index 7.1 (H) 0.0 - 4.0 %    Troponin-I, Qt. 21.90 (HH) 0.0 - 0.045 NG/ML   POC G3    Collection Time: 07/02/17  9:19 AM   Result Value Ref Range    Device: VENT      FIO2 (POC) 30 %    pH (POC) 7.414 7.35 - 7.45      pCO2 (POC) 39.9 35.0 - 45.0 MMHG    pO2 (POC) 155 (H) 80 - 100 MMHG    HCO3 (POC) 25.7 22 - 26 MMOL/L    sO2 (POC) 99 (H) 92 - 97 %    Base excess (POC) 1 mmol/L    Mode SIMV      Tidal volume 500 ml    Set Rate 10 bpm    PEEP/CPAP (POC) 5 cmH2O    Allens test (POC) N/A      Total resp. rate 10      Site DRAWN FROM ARTERIAL LINE      Patient temp. 36.0      Specimen type (POC) ARTERIAL      Performed by Willi Shock     Volume control plus YES     POC G3    Collection Time: 07/02/17 10:50 AM   Result Value Ref Range    Device: VENT      FIO2 (POC) 30 %    pH (POC) 7.481 (H) 7.35 - 7.45      pCO2 (POC) 28.9 (L) 35.0 - 45.0 MMHG    pO2 (POC) 159 (H) 80 - 100 MMHG    HCO3 (POC) 21.7 (L) 22 - 26 MMOL/L    sO2 (POC) 100 (H) 92 - 97 %    Base deficit (POC) 2 mmol/L    Mode SIMV      Tidal volume 500 ml    Set Rate 16 bpm    PEEP/CPAP (POC) 5 cmH2O    Pressure support 10 cmH2O    Allens test (POC) N/A      Total resp. rate 16      Site DRAWN FROM ARTERIAL LINE      Patient temp.  36.4      Specimen type (POC) ARTERIAL      Performed by Willi Shock     Volume control plus YES     SODIUM    Collection Time: 07/02/17 12:06 PM   Result Value Ref Range    Sodium 150 (H) 136 - 145 mmol/L   OSMOLALITY, SERUM/PLASMA    Collection Time: 07/02/17 12:06 PM   Result Value Ref Range    Osmolality, serum/plasma 330 (H) 280 - 300 MOSM/kg H2O   GLUCOSE, POC    Collection Time: 07/02/17 12:25 PM   Result Value Ref Range    Glucose (POC) 172 (H) 70 - 110 mg/dL           Assessment:     Principal Problem:    ICH (intracerebral hemorrhage) ([facility] (6/30/2017)    Active Problems:    IVH (intraventricular hemorrhage) ([facility] (6/30/2017)      Malignant hypertension requiring acute intensive management (6/30/2017)      Hydrocephalus (6/30/2017)      Cerebral edema ([facility] (6/30/2017)      Brain compression ([address] (6/30/2017)      Respiratory center failure (6/30/2017)      Cocaine abuse (6/30/2017)      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 Patient already has been hyperventilated, and hypertonic saline/mannitol are not options due to hypernatremia and serum osmolality. Long discussion with patient's family, who have agreed to make the patient DNR. I was consulted for possible seizure activity. Patient was started on Keppra by someone else. I have not observed or been told by nursing staff of any clinical seizure activity. I do not think that the patient has required Keppra, but I see no reason to discontinue it at this point. CCM team's assistance much appreciated.         Signed By: Rosita lBanco MD     July 2, 2017

## 2017-07-02 NOTE — PROGRESS NOTES
Neurovascular Progress Note      Patient: Vivien Carreon MRN: [id number]  SSN: [id number]    YOB: 1960  Age: 56 y.o. Sex: female      Events  Called that ICP increased to 26 then down to 16. EVD is draining. Vital Signs  Patient Vitals for the past 24 hrs:   Temp Pulse Resp BP SpO2   07/02/17 1000 - 78 16 155/84 100 %   07/02/17 0900 96.6 °F (35.9 °C) 72 21 (!) 169/97 100 %   07/02/17 0825 - 72 (!) 100 - 100 %   07/02/17 0800 96.3 °F (35.7 °C) 68 20 (!) 154/91 100 %   07/02/17 0700 95.9 °F (35.5 °C) 65 16 144/88 100 %   07/02/17 0600 95.7 °F (35.4 °C) 63 19 150/90 100 %   07/02/17 0500 - 61 16 151/90 100 %   07/02/17 0400 95.2 °F (35.1 °C) (!) 57 10 (!) 155/92 100 %   07/02/17 0359 - 60 10 - 100 %   07/02/17 0300 (!) 94.6 °F (34.8 °C) (!) 57 10 149/89 100 %   07/02/17 0200 (!) 94.1 °F (34.5 °C) (!) 52 10 133/83 100 %   07/02/17 0100 - (!) 56 10 137/86 100 %   07/02/17 0001 - 94 10 - 100 %   07/02/17 0000 (!) 94.1 °F (34.5 °C) (!) 110 10 135/88 100 %   07/01/17 2330 - (!) 114 11 (!) 161/95 100 %   07/01/17 2300 96.3 °F (35.7 °C) (!) 122 13 (!) 168/96 100 %   07/01/17 2200 98.1 °F (36.7 °C) (!) 103 20 148/88 100 %   07/01/17 2100 97.7 °F (36.5 °C) 97 20 130/80 100 %   07/01/17 2000 97.3 °F (36.3 °C) (!) 118 10 142/88 100 %   07/01/17 1948 - (!) 118 10 - 100 %   07/01/17 1900 - 85 13 - 100 %   07/01/17 1730 - (!) 56 10 105/72 98 %   07/01/17 1700 - (!) 56 10 104/65 100 %   07/01/17 1600 98.1 °F (36.7 °C) (!) 57 10 92/61 100 %   07/01/17 1555 - (!) 57 10 - 100 %   07/01/17 1500 97.7 °F (36.5 °C) 64 10 108/70 100 %   07/01/17 1400 99.7 °F (37.6 °C) 90 11 145/81 100 %   07/01/17 1300 (!) 101.3 °F (38.5 °C) 95 16 143/85 100 %   07/01/17 1200 (!) 100.9 °F (38.3 °C) 90 14 - 100 %   07/01/17 1111 - 91 10 - 100 %   07/01/17 1100 (!) 100.6 °F (38.1 °C) 83 10 122/73 100 %   ICP 12-16 with transient elevation to 20-26.     NIHSS Flow Sheet  Total: 34 (07/02/17 0900)     Vent  Vent Settings  FIO2 (%): 30 %  CMV Rate Set: 10  SIMV Rate Set: 10  Back-Up Rate: 10  Vt Set (ml): 500 ml  Pressure Support (cm H2O): 10 cm H2O  PEEP/VENT (cm H2O): 5 cm H20  I:E Ratio: 1:5.67  Insp Time (sec): 1 sec  Insp Rise Time %: 50 %  Flow Trigger: 3  Expiratory Sensitivity: 25 %      Intake and Output    Intake/Output Summary (Last 24 hours) at 07/02/17 1024  Last data filed at 07/02/17 1000   Gross per 24 hour   Intake          1149.76 ml   Output             1015 ml   Net           134.76 ml   CSF 35ml past 6 hours    Data    Recent Results (from the past 24 hour(s))   POTASSIUM    Collection Time: 07/01/17 11:00 AM   Result Value Ref Range    Potassium 3.8 3.5 - 5.5 mmol/L   CARDIAC PANEL,(CK, CKMB & TROPONIN)    Collection Time: 07/01/17 11:00 AM   Result Value Ref Range     (H) 26 - 192 U/L    CK - MB 18.7 (H) <3.6 ng/ml    CK-MB Index 7.4 (H) 0.0 - 4.0 %    Troponin-I, Qt. 16.10 (HH) 0.0 - 0.045 NG/ML   LACTIC ACID    Collection Time: 07/01/17 12:57 PM   Result Value Ref Range    Lactic acid 1.5 0.4 - 2.0 MMOL/L   GLUCOSE, POC    Collection Time: 07/01/17  1:57 PM   Result Value Ref Range    Glucose (POC) 142 (H) 70 - 110 mg/dL   LACTIC ACID    Collection Time: 07/01/17  5:10 PM   Result Value Ref Range    Lactic acid 2.2 (HH) 0.4 - 2.0 MMOL/L   TSH 3RD GENERATION    Collection Time: 07/01/17  5:10 PM   Result Value Ref Range    TSH 0.32 (L) 0.36 - 3.74 uIU/mL   CARDIAC PANEL,(CK, CKMB & TROPONIN)    Collection Time: 07/01/17  7:00 PM   Result Value Ref Range     (H) 26 - 192 U/L    CK - MB 11.0 (H) <3.6 ng/ml    CK-MB Index 5.3 (H) 0.0 - 4.0 %    Troponin-I, Qt. 22.60 (HH) 0.0 - 0.045 NG/ML   POTASSIUM    Collection Time: 07/01/17  7:00 PM   Result Value Ref Range    Potassium 4.2 3.5 - 5.5 mmol/L   GLUCOSE, POC    Collection Time: 07/01/17  7:38 PM   Result Value Ref Range    Glucose (POC) 121 (H) 70 - 110 mg/dL   LACTIC ACID    Collection Time: 07/01/17  8:45 PM   Result Value Ref Range    Lactic acid 1.5 0.4 - 2.0 MMOL/L   GLUCOSE, POC    Collection Time: 07/02/17 12:06 AM   Result Value Ref Range    Glucose (POC) 135 (H) 70 - 110 mg/dL   CBC W/O DIFF    Collection Time: 07/02/17  2:59 AM   Result Value Ref Range    WBC 13.7 (H) 4.6 - 13.2 K/uL    RBC 4.76 4.20 - 5.30 M/uL    HGB 12.0 12.0 - 16.0 g/dL    HCT 35.7 35.0 - 45.0 %    MCV 75.0 74.0 - 97.0 FL    MCH 25.2 24.0 - 34.0 PG    MCHC 33.6 31.0 - 37.0 g/dL    RDW 15.1 (H) 11.6 - 14.5 %    PLATELET 740 047 - 577 K/uL    MPV 11.5 9.2 - 11.8 FL   PROTHROMBIN TIME + INR    Collection Time: 07/02/17  2:59 AM   Result Value Ref Range    Prothrombin time 13.7 11.5 - 15.2 sec    INR 1.1 0.8 - 1.2     PTT    Collection Time: 07/02/17  2:59 AM   Result Value Ref Range    aPTT 35.8 23.0 - 99.8 SEC   METABOLIC PANEL, BASIC    Collection Time: 07/02/17  2:59 AM   Result Value Ref Range    Sodium 149 (H) 136 - 145 mmol/L    Potassium 4.8 3.5 - 5.5 mmol/L    Chloride 116 (H) 100 - 108 mmol/L    CO2 23 21 - 32 mmol/L    Anion gap 10 3.0 - 18 mmol/L    Glucose 153 (H) 74 - 99 mg/dL    BUN 39 (H) 7.0 - 18 MG/DL    Creatinine 1.74 (H) 0.6 - 1.3 MG/DL    BUN/Creatinine ratio 22 (H) 12 - 20      GFR est AA 37 (L) >60 ml/min/1.73m2    GFR est non-AA 30 (L) >60 ml/min/1.73m2    Calcium 8.4 (L) 8.5 - 10.1 MG/DL   CALCIUM, IONIZED    Collection Time: 07/02/17  2:59 AM   Result Value Ref Range    Ionized Calcium 1.10 (L) 1.12 - 1.32 MMOL/L   MAGNESIUM    Collection Time: 07/02/17  2:59 AM   Result Value Ref Range    Magnesium 2.6 1.6 - 2.6 mg/dL   PHOSPHORUS    Collection Time: 07/02/17  2:59 AM   Result Value Ref Range    Phosphorus 3.7 2.5 - 4.9 MG/DL   CARDIAC PANEL,(CK, CKMB & TROPONIN)    Collection Time: 07/02/17  2:59 AM   Result Value Ref Range     26 - 192 U/L    CK - MB 10.4 (H) <3.6 ng/ml    CK-MB Index 5.8 (H) 0.0 - 4.0 %    Troponin-I, Qt. 23.60 (HH) 0.0 - 0.045 NG/ML   PROTEIN, CSF    Collection Time: 07/02/17  3:15 AM   Result Value Ref Range    Tube No. BAGGED SPECIMEN Protein, (H) 15 - 45 MG/DL   GLUCOSE, CSF    Collection Time: 07/02/17  3:15 AM   Result Value Ref Range    Tube No. BAGGED SPECIMEN     Glucose,CSF 77 (H) 40 - 70 MG/DL   GLUCOSE, POC    Collection Time: 07/02/17  6:09 AM   Result Value Ref Range    Glucose (POC) 175 (H) 70 - 110 mg/dL   CARDIAC PANEL,(CK, CKMB & TROPONIN)    Collection Time: 07/02/17  8:03 AM   Result Value Ref Range     26 - 192 U/L    CK - MB 10.7 (H) <3.6 ng/ml    CK-MB Index 7.1 (H) 0.0 - 4.0 %    Troponin-I, Qt. 21.90 (HH) 0.0 - 0.045 NG/ML   POC G3    Collection Time: 07/02/17  9:19 AM   Result Value Ref Range    Device: VENT      FIO2 (POC) 30 %    pH (POC) 7.414 7.35 - 7.45      pCO2 (POC) 39.9 35.0 - 45.0 MMHG    pO2 (POC) 155 (H) 80 - 100 MMHG    HCO3 (POC) 25.7 22 - 26 MMOL/L    sO2 (POC) 99 (H) 92 - 97 %    Base excess (POC) 1 mmol/L    Mode SIMV      Tidal volume 500 ml    Set Rate 10 bpm    PEEP/CPAP (POC) 5 cmH2O    Allens test (POC) N/A      Total resp. rate 10      Site DRAWN FROM ARTERIAL LINE      Patient temp. 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 ICP transiently increased. EVD continues to drain. Will continue CSF diversion at current EVD setting of 10cm as there is concern that continued asymmetric ventricle decompression will continue to increase left to right shift. Neurovascular service will continue to help with EVD management. Please call if questions related to EVD function. Remainder of care per CCM and general neurology.         Angelique Partida MD

## 2017-07-02 NOTE — PROGRESS NOTES
PCCM Follow Up    Notified by bedside nurse, Cordelia Tai of progressive deterioration of pt throughout the day including rising ICP into the 50's, L dilated pupil. NA currently 150, serum osm 330. Keppra and propofol were started for possible underlying seizure with +significant nystagmus noted. Met with family at bedside-pt's daughter, son, and pt's sister as well as ICU attending, neurologist Dr. Joceline Gonzalez, and bedside RN Annie Ag. Discussed current condition, prognosis, treatment plans and goals of care. Therapeutic options, response discussed. Answered all questions and concerns to the best of our ability.   Discussed code status    Pt's family has elected to change code status to DNR  Will continue current care for now  Dacia notified    Jose A Qureshi   7/2/2017 4:25 PM

## 2017-07-02 NOTE — PROGRESS NOTES
Patient in bed on back with head elevated - BBS equal and sl coarse - ETT secure at 23 at the lips and moved to the right side of the mouth - patient suctioned by RN - CELIA tube cleared - MVB ay HOB - vent alarms on and functional    2323 - patient suctioned and CELIA tube cleared - ETT moved to the center of the mouth

## 2017-07-02 NOTE — PROGRESS NOTES
[facility]  ICU Progress Note        Subjective[de-identified] No new change. Continues to be on ventilator. Vital Signs:    Visit Vitals    /88    Pulse 72    Temp 95.9 °F (35.5 °C)    Resp (!) 100    Ht 5' 5\" (1.651 m)    Wt 46.9 kg (103 lb 6.3 oz)    SpO2 100%    Breastfeeding No    BMI 17.21 kg/m2       O2 Device: Ventilator       Temp (24hrs), Av.9 °F (36.6 °C), Min:94.1 °F (34.5 °C), Max:101.3 °F (38.5 °C)       Intake/Output:   Last shift:      701 -  190  In: -   Out: 23 [Urine:17; Drains:6]  Last 3 shifts: 1901 -  07  In: 2257.2 [I.V.:2137.2]  Out: 2926 [Urine:2693; Drains:232]    Intake/Output Summary (Last 24 hours) at 17 0848  Last data filed at 17 0800   Gross per 24 hour   Intake          1155.43 ml   Output             1095 ml   Net            60.43 ml       Ventilator Settings:  Ventilator  Mode: SIMV, VC+  Respiratory Rate  Back-Up Rate: 10  Insp Time (sec): 1 sec  I:E Ratio: 1:5.67  Ventilator Volumes  Vt Set (ml): 500 ml  Vt Exhaled (Machine Breath) (ml): 493 ml  Vt Spont (ml): 372 ml  Ve Observed (l/min): 4.9 l/min  Ventilator Pressures  Pressure Support (cm H2O): 10 cm H2O  PIP Observed (cm H2O): 24 cm H2O  Plateau Pressure (cm H2O): 16 cm H2O  MAP (cm H2O): 8.2  PEEP/VENT (cm H2O): 5 cm H20  Auto PEEP Observed (cm H2O): 0.1 cm H2O    Physical Exam:    General: Spontaneous eye opening, not breathing over ventilator, no response to painful stimuli.    HEENT:  Anicteric sclerae; pink palpebral conjunctivae; mucosa moist  Resp:  Symmetrical chest expansion, no accessory muscle use; good airway entry; no rales/ wheezing/ rhonchi noted  CV:  S1, S2 present; regular rate and rhythm  GI:  Abdomen soft, non-tender; (+) active bowel sounds  Extremities:  +2 pulses on all extremities; no edema/ cyanosis/ clubbing noted  Skin:  Warm; no rashes/ lesions noted  Neurologic:  Spontaneous eye opening, not breathing over ventilator, no response to painful stimuli. Cough reflex + on deep suction stimuli +, corneal reflex +, pupil bilaterally small with no light reflex. Spontaneous bilateral horizontal nystagmus regardless of head position.      DATA:     Current Facility-Administered Medications   Medication Dose Route Frequency    dextrose 5% and 0.9% NaCl infusion  0-50 mL/hr IntraVENous CONTINUOUS    insulin lispro (HUMALOG) injection   SubCUTAneous Q6H    glucose chewable tablet 16 g  4 Tab Oral PRN    glucagon (GLUCAGEN) injection 1 mg  1 mg IntraMUSCular PRN    dextrose (D50W) injection syrg 12.5-25 g  25-50 mL IntraVENous PRN    famotidine (PF) (PEPCID) injection 20 mg  20 mg IntraVENous DAILY    bacitracin 500 unit/gram packet 1 Packet  1 Packet Topical PRN    sodium chloride (NS) flush 10-30 mL  10-30 mL InterCATHeter PRN    sodium chloride (NS) flush 10 mL  10 mL InterCATHeter Q24H    sodium chloride (NS) flush 10 mL  10 mL InterCATHeter PRN    sodium chloride (NS) flush 10-40 mL  10-40 mL InterCATHeter Q8H    sodium chloride (NS) flush 20 mL  20 mL InterCATHeter Q24H    labetalol (NORMODYNE;TRANDATE) 300 mg in 0.9% sodium chloride 150 mL (2 mg / 1 mL) infusion  0.5-2 mg/min IntraVENous TITRATE    ELECTROLYTE REPLACEMENT PROTOCOL  1 Each Other Q8H    ELECTROLYTE REPLACEMENT PROTOCOL  1 Each Other Q8H    ELECTROLYTE REPLACEMENT PROTOCOL  1 Each Other Q8H    PHARMACY INFORMATION NOTE  1 Each Other DAILY    chlorhexidine (PERIDEX) 0.12 % mouthwash 15 mL  15 mL Oral Q12H    ondansetron (ZOFRAN) injection 1 mg  1 mg IntraVENous Q6H PRN    labetalol (NORMODYNE;TRANDATE) 20 mg/4 mL (5 mg/mL) injection 5 mg  5 mg IntraVENous Q15MIN PRN    acetaminophen (TYLENOL) tablet 650 mg  650 mg Oral Q4H PRN    acetaminophen (TYLENOL) suppository 650 mg  650 mg Rectal Q4H PRN    senna-docusate (PERICOLACE) 8.6-50 mg per tablet 2 Tab  2 Tab Oral QHS    bisacodyl (DULCOLAX) tablet 5 mg  5 mg Oral DAILY PRN    bisacodyl (DULCOLAX) suppository 10 mg  10 mg Rectal DAILY PRN    albuterol (PROVENTIL VENTOLIN) nebulizer solution 2.5 mg  2.5 mg Nebulization Q4H PRN    niCARdipine (CARDENE) 125 mg in 0.9% sodium chloride 250 mL infusion  0-15 mg/hr IntraVENous TITRATE    folic acid (FOLVITE) 1 mg, thiamine (B-1) 100 mg in 0.9% sodium chloride 50 mL ivpb   IntraVENous DAILY         Labs: Results:       Chemistry Recent Labs      07/02/17   0259  07/01/17   1900  07/01/17   1100  07/01/17   0318  06/30/17   1830  06/30/17   1520   GLU  153*   --    --   168*  225*  219*   NA  149*   --    --   141  129*  137   K  4.8  4.2  3.8  4.0  2.5*  2.6*   CL  116*   --    --   104  92*  98*   CO2  23   --    --   26  26  28   BUN  39*   --    --   24*  25*  25*   CREA  1.74*   --    --   1.31*  1.35*  1.28   CA  8.4*   --    --   8.8  7.9*  9.2   AGAP  10   --    --   11  11  11   BUCR  22*   --    --   18  19  20   AP   --    --    --    --    --   133*   TP   --    --    --    --    --   8.3*   ALB   --    --    --    --    --   4.1   GLOB   --    --    --    --    --   4.2*   AGRAT   --    --    --    --    --   1.0      CBC w/Diff Recent Labs      07/02/17   0259 07/01/17 0318 06/30/17   1520   WBC  13.7*  14.6*  13.6*   RBC  4.76  5.53*  5.69*   HGB  12.0  14.3  14.6   HCT  35.7  41.0  42.2   PLT  231  261  268   GRANS   --    --   80*   LYMPH   --    --   15*   EOS   --    --   1      Coagulation Recent Labs      07/02/17   0259 07/01/17   0318   PTP  13.7  12.1   INR  1.1  0.9   APTT  35.8  31.6       Liver Enzymes Recent Labs      06/30/17   1520   TP  8.3*   ALB  4.1   AP  133*   SGOT  22      ABG No results found for: PH, PHI, PCO2, PCO2I, PO2, PO2I, HCO3, HCO3I, FIO2, FIO2I   Microbiology Recent Labs      07/01/17   0328  06/30/17   1950  06/30/17   1830   CULT  PENDING  NO GROWTH AFTER 14 HOURS  NO GROWTH AFTER 19 HOURS            Impression:    Patient is a 56 y.o. female with PMH cocaine use, HTN, who presented to the ED after pt was found unresponsive on the floor by friends. She was initially brought to [facility], in ED pt was hypertensive and CT head showed L thalamus ICH, pt transferred to [facility] for further evaluation and management. Pt was intubated for airway protection, given mannitol, placed on cardene. UDS +cocaine. WBC 13.6, LA 3.3. There was also a question of possible seizure activity. Pt had a R EVD placed by Dr. Alex Quiroz. CT head this am showed increase in L thalamic hematoma with increased IV extension and obstructive hydrocephalus. We were consulted for ventilator management. Neurogenic respiratory failure: Pt. Without any respiratory effort. Not on sedation. Cont. Ventilator support. ICH (left thalamus) with intraventricular extension and obstructive hydrocephalus: Being managed by neurosurgery colleagues. CPP goal 60-70. EVD placed, set at 10 cmH20. This am intraventricular pressure was 27, Dr. Alex Quiroz being paged. ? Sedation for possible underlying seizure ? Keppra. Na 149 ? Mannitol. CPP with in goal which is reassuring. I will get an ABG and try to hyperventilate. Pt. Has unlikely to survive the hospital stay, ICH score is 4 which means mortality of 97%. Renal failure: Pt. Has worsening renal failure (increasing creatinine and fall in urine output). Probably from  Hypovolemia - will increase IVF maintenance. Addendum:  - ICP now close to 40, I am concerned about herniation. Will start propofol, IV keppra, hypertonic saline.  -  I updated the family about current situation and poor prognosis.      Randy Marie MD

## 2017-07-02 NOTE — PROGRESS NOTES
Hospitalist Progress Note  Luisa Albarado MD  Internal medicine/ Hospitalist    Daily Progress Note: 7/2/2017 3:21 PM      Interval history / Subjective:   Patient with admitted for [address] left thalamus(32 mm x 30 mm x 22 mm). The blood extended into the third ventricle resulting in blood in the left more than right lateral ventricles which are enlarged, aqueduct, and fourth ventricle. Patient is s/p ventriculostomy with evd right frontal.Patient is intubated and unresponsive.  is following and has recommended neurology consult.     Current Facility-Administered Medications   Medication Dose Route Frequency    dextrose 5% and 0.9% NaCl infusion  100 mL/hr IntraVENous CONTINUOUS    hypertonic saline 3 % (NEURO SCIENCE USE ONLY) infusion  0.25-1 mL/kg/hr IntraVENous TITRATE    propofol (DIPRIVAN) infusion  5-50 mcg/kg/min IntraVENous TITRATE    NOREPINephrine (LEVOPHED) 8,000 mcg in dextrose 5% 250 mL infusion  2-16 mcg/min IntraVENous TITRATE    levETIRAcetam (KEPPRA) 500 mg in 0.9% sodium chloride (MBP/ADV) 100 mL MBP  500 mg IntraVENous Q12H    insulin lispro (HUMALOG) injection   SubCUTAneous Q6H    glucose chewable tablet 16 g  4 Tab Oral PRN    glucagon (GLUCAGEN) injection 1 mg  1 mg IntraMUSCular PRN    dextrose (D50W) injection syrg 12.5-25 g  25-50 mL IntraVENous PRN    famotidine (PF) (PEPCID) injection 20 mg  20 mg IntraVENous DAILY    bacitracin 500 unit/gram packet 1 Packet  1 Packet Topical PRN    sodium chloride (NS) flush 10-30 mL  10-30 mL InterCATHeter PRN    sodium chloride (NS) flush 10 mL  10 mL InterCATHeter Q24H    sodium chloride (NS) flush 10 mL  10 mL InterCATHeter PRN    sodium chloride (NS) flush 10-40 mL  10-40 mL InterCATHeter Q8H    sodium chloride (NS) flush 20 mL  20 mL InterCATHeter Q24H    labetalol (NORMODYNE;TRANDATE) 300 mg in 0.9% sodium chloride 150 mL (2 mg / 1 mL) infusion  0.5-2 mg/min IntraVENous TITRATE    ELECTROLYTE REPLACEMENT PROTOCOL  1 Each Other Q8H    ELECTROLYTE REPLACEMENT PROTOCOL  1 Each Other Q8H    ELECTROLYTE REPLACEMENT PROTOCOL  1 Each Other Q8H    PHARMACY INFORMATION NOTE  1 Each Other DAILY    chlorhexidine (PERIDEX) 0.12 % mouthwash 15 mL  15 mL Oral Q12H    ondansetron (ZOFRAN) injection 1 mg  1 mg IntraVENous Q6H PRN    labetalol (NORMODYNE;TRANDATE) 20 mg/4 mL (5 mg/mL) injection 5 mg  5 mg IntraVENous Q15MIN PRN    acetaminophen (TYLENOL) tablet 650 mg  650 mg Oral Q4H PRN    acetaminophen (TYLENOL) suppository 650 mg  650 mg Rectal Q4H PRN    senna-docusate (PERICOLACE) 8.6-50 mg per tablet 2 Tab  2 Tab Oral QHS    bisacodyl (DULCOLAX) tablet 5 mg  5 mg Oral DAILY PRN    bisacodyl (DULCOLAX) suppository 10 mg  10 mg Rectal DAILY PRN    albuterol (PROVENTIL VENTOLIN) nebulizer solution 2.5 mg  2.5 mg Nebulization Q4H PRN    niCARdipine (CARDENE) 125 mg in 0.9% sodium chloride 250 mL infusion  0-15 mg/hr IntraVENous TITRATE    folic acid (FOLVITE) 1 mg, thiamine (B-1) 100 mg in 0.9% sodium chloride 50 mL ivpb   IntraVENous DAILY        Objective:     Visit Vitals    BP (!) 204/99    Pulse 71    Temp 96.6 °F (35.9 °C)    Resp 16    Ht 5' 5\" (1.651 m)    Wt 46.9 kg (103 lb 6.3 oz)    SpO2 100%    Breastfeeding No    BMI 17.21 kg/m2      O2 Device: Ventilator    Temp (24hrs), Av.2 °F (35.7 °C), Min:94.1 °F (34.5 °C), Max:98.1 °F (36.7 °C)       07 -  1900  In: 657.3 [I.V.:657.3]  Out: 230 [Urine:184; Drains:46]  1901 -  0700  In: 2257.2 [I.V.:2137.2]  Out: 2926 [NAPEE:2083; Drains:232]   P/E  Gen: Intubated. Does not respond to stimulus. Cough and gag present  HEENT:  S/P ventriculostomy with evd right frontal. Pupils non reactive, equal.  Neck:  Supple, No JVD  Lungs:  Clear bilaterally, breath sounds equal, no wheeze, no rales. Cardiovascular:  Regular Rate and Rhythm, normal S1 and S2, no audible murmur. Capillary refill: < 3 seconds.   Abdomen:  Soft, non distended, normal bowel sounds, no guarding. Extremities:  Well perfused, no cyanosis, no edema                       Peripheral pulse:2+  Neurological:  Unresponsive, intubated. Data Review    Recent Results (from the past 12 hour(s))   GLUCOSE, POC    Collection Time: 07/02/17  6:09 AM   Result Value Ref Range    Glucose (POC) 175 (H) 70 - 110 mg/dL   CARDIAC PANEL,(CK, CKMB & TROPONIN)    Collection Time: 07/02/17  8:03 AM   Result Value Ref Range     26 - 192 U/L    CK - MB 10.7 (H) <3.6 ng/ml    CK-MB Index 7.1 (H) 0.0 - 4.0 %    Troponin-I, Qt. 21.90 (HH) 0.0 - 0.045 NG/ML   POC G3    Collection Time: 07/02/17  9:19 AM   Result Value Ref Range    Device: VENT      FIO2 (POC) 30 %    pH (POC) 7.414 7.35 - 7.45      pCO2 (POC) 39.9 35.0 - 45.0 MMHG    pO2 (POC) 155 (H) 80 - 100 MMHG    HCO3 (POC) 25.7 22 - 26 MMOL/L    sO2 (POC) 99 (H) 92 - 97 %    Base excess (POC) 1 mmol/L    Mode SIMV      Tidal volume 500 ml    Set Rate 10 bpm    PEEP/CPAP (POC) 5 cmH2O    Allens test (POC) N/A      Total resp. rate 10      Site DRAWN FROM ARTERIAL LINE      Patient temp. 36.0      Specimen type (POC) ARTERIAL      Performed by Tina Villarreal     Volume control plus YES     POC G3    Collection Time: 07/02/17 10:50 AM   Result Value Ref Range    Device: VENT      FIO2 (POC) 30 %    pH (POC) 7.481 (H) 7.35 - 7.45      pCO2 (POC) 28.9 (L) 35.0 - 45.0 MMHG    pO2 (POC) 159 (H) 80 - 100 MMHG    HCO3 (POC) 21.7 (L) 22 - 26 MMOL/L    sO2 (POC) 100 (H) 92 - 97 %    Base deficit (POC) 2 mmol/L    Mode SIMV      Tidal volume 500 ml    Set Rate 16 bpm    PEEP/CPAP (POC) 5 cmH2O    Pressure support 10 cmH2O    Allens test (POC) N/A      Total resp. rate 16      Site DRAWN FROM ARTERIAL LINE      Patient temp.  36.4      Specimen type (POC) ARTERIAL      Performed by Tina Villarreal     Volume control plus YES     SODIUM    Collection Time: 07/02/17 12:06 PM   Result Value Ref Range    Sodium 150 (H) 136 - 145 mmol/L   OSMOLALITY, SERUM/PLASMA    Collection Time: 07/02/17 12:06 PM   Result Value Ref Range    Osmolality, serum/plasma 330 (H) 280 - 300 MOSM/kg H2O   GLUCOSE, POC    Collection Time: 07/02/17 12:25 PM   Result Value Ref Range    Glucose (POC) 172 (H) 70 - 110 mg/dL         Assessment/Plan:     Principal Problem:    ICH (intracerebral hemorrhage) ([address] (6/30/2017)    Active Problems:    IVH (intraventricular hemorrhage) ([address] (6/30/2017)      Malignant hypertension requiring acute intensive management (6/30/2017)      Hydrocephalus (6/30/2017)      Cerebral edema (HCC) (6/30/2017)      Brain compression ([address] (6/30/2017)      Respiratory center failure (6/30/2017)      Cocaine abuse (6/30/2017)      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 EVD drain. Dr Dru Webb following    -Neurology consulted.    -On keppra  2-Malignant hypertension:    -On cardene drip  3-Respiratory failure:    -Pt intubated,critical care involved.   4-Cocaine abuse  5-Poor prognosis,will involve palliative care   DVT prophylaxis:scds  Full code  Disposition:remains in icu

## 2017-07-02 NOTE — PROGRESS NOTES
SLP NOTE:  SLP attempting evaluation, however patient is currently intubated on ventilator. SLP will follow up next day or as appropriate.     Thank you for this referral.    Dilia Aly M.S., CF-SLP  Speech-Language Pathologist

## 2017-07-02 NOTE — PROGRESS NOTES
conducted an initial consultation and Spiritual Assessment for Deanna Cabrera, who is a 56 y.o.,female. Patients Primary Language is: [place]. According to the patients EMR Taoist Affiliation is: [facility].     The reason the Patient came to the hospital is:   Patient Active Problem List    Diagnosis Date Noted    ICH (intracerebral hemorrhage) ([facility] [place] [address] 06/30/2017    IVH (intraventricular hemorrhage) ([facility] [place] [address] 06/30/2017    Malignant hypertension requiring acute intensive management 06/30/2017    Hydrocephalus 06/30/2017    Cerebral edema ([facility] [place] [address] 06/30/2017    Brain compression ([facility] [place] [address] 06/30/2017    Respiratory center failure 06/30/2017    Cocaine abuse 06/30/2017    EVD right frontal 06/30/2017    LCFV CVL 06/30/2017         The  provided the following Interventions for Family:  Initiated a relationship of care and support. Explored issues of usha, belief, spirituality and Uatsdin/ritual needs while visiting patient. Listened empathically. Provided information about Spiritual Care Services. Offered prayer and assurance of continued prayers on patient's and family's behalf. The following outcomes were achieved:  Family shared limited information about both their spiritual journey/beliefs, and emotional premise about loved one. Family processed feeling about patient's current hospitalization. Family expressed gratitude for 's visit. Assessment:  Family does not have any Uatsdin/cultural needs that will affect their loved one's/the patient's stay at hospital.  There are no further spiritual or Uatsdin issues which require intervention at this time. Plan:  Chaplains will continue to follow up and will provide pastoral care on an as needed/requested basis.  recommends bedside caregivers page  on duty if family shows signs of acute spiritual or emotional distress. STEFFANY MartinDiv.   [address] [facility]  [phone] conducted a Follow up consultation and Spiritual Assessment for [facility], who is a 56 y.o.,female, along with the family member/s. The  provided the following Interventions for Family:  Continued the relationship of care and support. Listened empathically. Offered prayer and assurance of continued prayer on family's behalf. Chart reviewed. The following outcomes were achieved:  Family expressed gratitude for 's visit. Assessment:  There are no spiritual or Jewish issues which require intervention at this time. Plan:  Chaplains will continue to follow and will provide pastoral care on an as needed/requested basis.  recommends bedside caregivers page  on duty if family shows signs of acute spiritual or emotional distress. Beatrice Tierney M.Div.   [facility] [address]  [phone]

## 2017-07-03 PROBLEM — R41.82 ALTERED MENTAL STATUS: Status: ACTIVE | Noted: 2017-01-01

## 2017-07-03 PROBLEM — R56.9 SEIZURES (HCC): Status: ACTIVE | Noted: 2017-01-01

## 2017-07-03 NOTE — PROGRESS NOTES
[facility]    Pt seen and evaluated at bedside multiple times throughout the day. She has had increasing ICP up to 100 at times, SBP>200 and CPP in the 30's. EVD draining about 5/hr  Na has continued to rise to 163  She continues to have a cough with deep suctioning, fixed pupils, not breathing over vent    Above discussed with neurology, Dr. Tawny Will    Family updated at bedside. Option of comfort measures discussed, as pt does not yet meet brain death criteria. They are awaiting the arrival of an additional sibling for further discussion and decisions. During family discussion, pt became more tachycardic and SBP began to drop into 80's with CPP in 10's. Ildefonso and levophed started. Family appears to understand very poor prognosis. Shahriar Dumas PA-C    Attending:  - Pt. Has now lost the cough and gag reflex. Essentially no cerebral or brainstem reflex now. Discussed with the family, waiting for rest of the family to arrive.  Potential withdrawal.

## 2017-07-03 NOTE — PROGRESS NOTES
Neurology Progress Note      Patient: [place] Prescott MRN: [id number]  SSN: [id number]    YOB: 1960  Age: 56 y.o. Sex: female      Admit Date: 6/30/2017    LOS: 3 days     Subjective:     Chief Complaint:  ICH (intracerebral hemorrhage) ([facility] [address] Patient was followed by my partner Dr. Tory Lorenz. Chart reviewed. Large left ICH and large right MCA stroke. Clinical condition deteriorated and now with fixed pupils and not breathing over the vent but still with gag reflex with deep suctioning. Discussed with ICU attending Dr. Norbert Engel       Objective:     Vitals:    07/03/17 1200 07/03/17 1300 07/03/17 1400 07/03/17 1419   BP: (!) 205/113 (!) 200/112 (!) 259/94    Pulse: 93 89 86 (!) 138   Resp: 16 16 16 19   Temp:       SpO2: 100% 100% 100% 100%   Weight:       Height:                Physical Exam:   Intubated, unresponsive to noxious stimulus. Pupils 5 mm non-reactive to lights. No corneal reflex. No oculocephalic reflex. Flaccid extremities. With deep suctioning she had gag reflex. Review of systems: in coma. Past Medical History:   Diagnosis Date    Crack cocaine use     Hypertension      Social History     Social History    Marital status:      Spouse name: N/A    Number of children: N/A    Years of education: N/A     Occupational History    Not on file. Social History Main Topics    Smoking status: Current Every Day Smoker     Types: Cigarettes    Smokeless tobacco: Never Used    Alcohol use No    Drug use: Yes     Special: Cocaine    Sexual activity: Not on file     Other Topics Concern    Not on file     Social History Narrative    No narrative on file         CT Results (most recent): recent CT head was reviewed.     Assessment/Plan:     Principal Problem:    ICH (intracerebral hemorrhage) ([facility] [address] (6/30/2017)    Active Problems:    IVH (intraventricular hemorrhage) ([facility] [address] (6/30/2017)      Malignant hypertension requiring acute intensive management (6/30/2017) Hydrocephalus (6/30/2017)      Cerebral edema (HCC) (6/30/2017)      Brain compression ([address] (6/30/2017)      Respiratory center failure (6/30/2017)      Cocaine abuse (6/30/2017)      EVD right frontal (6/30/2017)      LCFV CVL (6/30/2017)    Patient in coma with fixed pupils. Very poor prognosis for recovery. Can't start Brain dead status assessment yet since still have gaga reflex. Supportive care till family decide about level of care.       Signed By: Lona Bell MD     July 3, 2017

## 2017-07-03 NOTE — PROGRESS NOTES
Problem: Ventilator Management  Goal: *Adequate oxygenation and ventilation  Rec'd pt on Vent:  SIMV, PSV=10, VC+  Of=712, SIMV rate=16, peep=5, Fio2=30  Vent check completed - suction in line - oral - kimber tube patent - ETT 23 lip - adjust to center  Sedation stopped by RN     0850-Begin SBT - pt not tolerating - goes apneic - attempt to stimulate but no change in status  0855-End SBT due to apnea    1808-Extubate to comfort care    -Order placed to extubate to comfort care    Resp Plan:  Monitor pt on vent. Wean and/or titrate as needed to maintain pt sats or comfort. Provide suction as needed to maintain patent airway. Continue mechanical ventilation in support of patient care plan. Resp Goal:  Maintain respiratory stability on vent in support of patient & physician plan of care.

## 2017-07-03 NOTE — ROUTINE PROCESS
0700: Bedside shift change report given to Amando Hargrove RN (oncoming nurse) by Chino Colvin RN (offgoing nurse). Report included the following information SBAR, Kardex, ED Summary, OR Summary, Procedure Summary, Intake/Output, MAR, Accordion, Recent Results and Med Rec Status. 0800: Ashlyn Lamar NP on unit, I asked if she and/or Dr. Florecita Malik would be treating or addressing the patient's high ICPs today and she stated that they were not managing the patient or the ICPs. 0913: Propofol paused to initiate SAT and SBT. Failed SBT d/t apnea. 1000: ICP addressed at rounds, will continue to treat for a CPP goal of 60-70 as had been entered by Dr. Florecita Malik. 1130: Critical NA value 163, Yanni, [address] notified, new orders received. 1420: HR increased into 140's, sustained for 1-2 minutes, ICP 98. Notified Jojo Adamson, [address] and Dr. Horace Riggs. At bedside, also notified of high urine output. I was requested to call the family to come in and get a status update in-person. I called and spoke with Adam Rocha, daughter who stated she would call her brother and aunt and they would be at the bedside shortly. 1430: Dr. Cristi Bowers at bedside. 1435: Son arrived. 1500: [facility] contacted. Daughter arrived, Dr. Horace Riggs and Brigido Dandy, PA notified of family presence. 1520: Dr. Horace Riggs and CHRISTIANE ALVARADO at bedside. Norepi started. 1530: Dr. Rosa Higginbotham and  paged. 1600: SBP remaining below goal of 170, Dr. Horace Riggs notified, new orders received for phenylepherine. [facility] rep called to say the patient was not a candidate for donation. 1615: Cough and gag no longer present. 1500: All pressers turned off per family request via Dr. Cyrus Carrillo: Pt extubated. Family at bedside. 1835: Pt pronounced dead by Dr. Misti Ruggiero. Family notified. Nursing supervisor Robinson Costa, RN notified. : Dacia notified. Asked that the patient not be released to  home until we hear from they eye bank.

## 2017-07-03 NOTE — PROGRESS NOTES
1900> Assumed patient care. Patient assessed. Patient is lying in bed. Family at bedside. Patient does not open eye on any stimuli, does not follows commands, does not withdraw extremities to noxious stimuli. Pupils + 6 fixed. EVD 5 and open. Patient does not show any signs of pain or discomfort. No further changes or complaints. Call bell within reach. Bed is locked in low position. Side rails up x 3. Will continue to monitor. 0000> No neuro changes. [facility] updated about patient's condition. 0600> [facility] updated about patient's condition. 0600> Early mobilization was not provided because patient unresponsive. PROM, joint protection, skin protection are provided. 0700> Bedside and Verbal shift change report given to Star Leary RN (oncoming nurse) by Jalyn Arriaga RN (offgoing nurse). Report included the following information SBAR, Kardex, Intake/Output, MAR and Recent Results.

## 2017-07-03 NOTE — CDMP QUERY
Please clarify if this patient is being treated/managed for:    => Coma Secondary to Intraventricular Hemorrhage    =>Other Explanation of clinical findings  =>Unable to Determine (no explanation of clinical findings)    The medical record reflects the following:    Risk: 57 yo female with PMH crack cocaine abuse, HTN    Clinical Indicators: Per H&P  ICH (intracerebral hemorrhage  Per CT  \"On CT she was found to have a left thalamic hemorrhage with interventricular extension and cerebral edema. \"  Per Neuro surgery consult  GCS 5. NIHSS 34. Pupils noted as left 3mm not reactive, right 1-2mm not reactive  Per Neurology consult GCS 3-4 2 points 5-12 1 point  Per Pulmonary note Re-examined 8:30 pm: flaccid no response to any stimulus   * conjugate leftward gaze with nystagmus to midline; OCR are present but I cannot detect any pupillary LR  * minimal gag       Treatment: Ventriculostomy, Neuro ICU monitoring,     Please clarify and document your clinical opinion in the progress notes and discharge summary including the definitive and/or presumptive diagnosis, (suspected or probable), related to the above clinical findings. Please include clinical findings supporting your diagnosis. If you DECLINE this query or would like to communicate with [facility], please utilize the \"[facility] message box\" at the TOP of the Progress Note on the right.       Thank you,  Miles Otero RN [facility]  [phone]

## 2017-07-03 NOTE — PROGRESS NOTES
Hospitalist Progress Note  Josefina Dyer MD  Internal medicine/ Hospitalist    Daily Progress Note: 7/3/2017 3:21 PM      Interval history / Subjective:   Patient with admitted for [address] left thalamus(32 mm x 30 mm x 22 mm). The blood extended into the third ventricle resulting in blood in the left more than right lateral ventricles which are enlarged, aqueduct, and fourth ventricle. Patient is s/p ventriculostomy with evd right frontal.Patient is intubated and unresponsive.  is following,general neurology involved.     Current Facility-Administered Medications   Medication Dose Route Frequency    dextrose 5% infusion  150 mL/hr IntraVENous CONTINUOUS    PHENYLephrine 30 mg in 0.9% sodium chloride 250 mL (KORY-SYNEPHRINE) infusion  0-200 mcg/min IntraVENous TITRATE    hypertonic saline 3 % (NEURO SCIENCE USE ONLY) infusion  0.25-1 mL/kg/hr IntraVENous TITRATE    propofol (DIPRIVAN) infusion  5-50 mcg/kg/min IntraVENous TITRATE    NOREPINephrine (LEVOPHED) 8,000 mcg in dextrose 5% 250 mL infusion  0-20 mcg/min IntraVENous TITRATE    levETIRAcetam (KEPPRA) 500 mg in 0.9% sodium chloride (MBP/ADV) 100 mL MBP  500 mg IntraVENous Q12H    insulin lispro (HUMALOG) injection   SubCUTAneous Q6H    glucose chewable tablet 16 g  4 Tab Oral PRN    glucagon (GLUCAGEN) injection 1 mg  1 mg IntraMUSCular PRN    dextrose (D50W) injection syrg 12.5-25 g  25-50 mL IntraVENous PRN    famotidine (PF) (PEPCID) injection 20 mg  20 mg IntraVENous DAILY    bacitracin 500 unit/gram packet 1 Packet  1 Packet Topical PRN    sodium chloride (NS) flush 10-30 mL  10-30 mL InterCATHeter PRN    sodium chloride (NS) flush 10 mL  10 mL InterCATHeter Q24H    sodium chloride (NS) flush 10 mL  10 mL InterCATHeter PRN    sodium chloride (NS) flush 10-40 mL  10-40 mL InterCATHeter Q8H    sodium chloride (NS) flush 20 mL  20 mL InterCATHeter Q24H    labetalol (NORMODYNE;TRANDATE) 300 mg in 0.9% sodium chloride 150 mL (2 mg / 1 mL) infusion  0.5-2 mg/min IntraVENous TITRATE    ELECTROLYTE REPLACEMENT PROTOCOL  1 Each Other Q8H    ELECTROLYTE REPLACEMENT PROTOCOL  1 Each Other Q8H    ELECTROLYTE REPLACEMENT PROTOCOL  1 Each Other Q8H    PHARMACY INFORMATION NOTE  1 Each Other DAILY    chlorhexidine (PERIDEX) 0.12 % mouthwash 15 mL  15 mL Oral Q12H    ondansetron (ZOFRAN) injection 1 mg  1 mg IntraVENous Q6H PRN    labetalol (NORMODYNE;TRANDATE) 20 mg/4 mL (5 mg/mL) injection 5 mg  5 mg IntraVENous Q15MIN PRN    acetaminophen (TYLENOL) tablet 650 mg  650 mg Oral Q4H PRN    acetaminophen (TYLENOL) suppository 650 mg  650 mg Rectal Q4H PRN    senna-docusate (PERICOLACE) 8.6-50 mg per tablet 2 Tab  2 Tab Oral QHS    bisacodyl (DULCOLAX) tablet 5 mg  5 mg Oral DAILY PRN    bisacodyl (DULCOLAX) suppository 10 mg  10 mg Rectal DAILY PRN    albuterol (PROVENTIL VENTOLIN) nebulizer solution 2.5 mg  2.5 mg Nebulization Q4H PRN    niCARdipine (CARDENE) 125 mg in 0.9% sodium chloride 250 mL infusion  0-15 mg/hr IntraVENous TITRATE    folic acid (FOLVITE) 1 mg, thiamine (B-1) 100 mg in 0.9% sodium chloride 50 mL ivpb   IntraVENous DAILY        Objective:     Visit Vitals    /85    Pulse (!) 121    Temp 97.4 °F (36.3 °C)    Resp 16    Ht 5' 5\" (1.651 m)    Wt 45.6 kg (100 lb 8.5 oz)    SpO2 100%    Breastfeeding No    BMI 16.73 kg/m2      O2 Device: Ventilator    Temp (24hrs), Av.4 °F (36.3 °C), Min:96.1 °F (35.6 °C), Max:99.9 °F (37.7 °C)      701 -  1900  In: 321.4 [I.V.:321.4]  Out: 1819.5 [Urine:1785; Drains:34.5]  1901 -  0700  In: 3487.2 [I.V.:3337.2]  Out: 8211 [RSYNS:5733; Drains:254]   P/E  Gen: Intubated. Does not respond to stimulus. Cough and gag present  HEENT:  S/P ventriculostomy with evd right frontal. Pupils non reactive, equal.  Neck:  Supple, No JVD  Lungs:  Clear bilaterally, breath sounds equal, no wheeze, no rales.   Cardiovascular:  Regular Rate and Rhythm, normal S1 and S2, no audible murmur. Capillary refill: < 3 seconds. Abdomen:  Soft, non distended, normal bowel sounds, no guarding. Extremities:  Well perfused, no cyanosis, no edema                       Peripheral pulse:2+  Neurological:  Unresponsive, intubated. Data Review    Recent Results (from the past 12 hour(s))   GLUCOSE, POC    Collection Time: 07/03/17  5:23 AM   Result Value Ref Range    Glucose (POC) 136 (H) 70 - 110 mg/dL   OSMOLALITY, SERUM/PLASMA    Collection Time: 07/03/17 10:05 AM   Result Value Ref Range    Osmolality, serum/plasma 357 (H) 280 - 300 MOSM/kg H2O   SODIUM    Collection Time: 07/03/17 10:05 AM   Result Value Ref Range    Sodium 163 (HH) 136 - 145 mmol/L   CARDIAC PANEL,(CK, CKMB & TROPONIN)    Collection Time: 07/03/17 10:05 AM   Result Value Ref Range    CK 91 26 - 192 U/L    CK - MB 4.0 (H) <3.6 ng/ml    CK-MB Index 4.4 (H) 0.0 - 4.0 %    Troponin-I, Qt. 16.80 (HH) 0.0 - 0.045 NG/ML   GLUCOSE, POC    Collection Time: 07/03/17 11:03 AM   Result Value Ref Range    Glucose (POC) 127 (H) 70 - 110 mg/dL         Assessment/Plan:     Principal Problem:    ICH (intracerebral hemorrhage) ([facility] [address] (6/30/2017)    Active Problems:    IVH (intraventricular hemorrhage) ([facility] [address] (6/30/2017)      Malignant hypertension requiring acute intensive management (6/30/2017)      Hydrocephalus (6/30/2017)      Cerebral edema (HCC) (6/30/2017)      Brain compression (HCC) (6/30/2017)      Respiratory center failure (6/30/2017)      Cocaine abuse (6/30/2017)      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 EVD drain. Dr Haro Memos following    -Neurology consulted.    -On keppra  2-Malignant hypertension:    -On cardene drip  3-Respiratory failure:    -Pt intubated,critical care involved. 4-Cocaine abuse  5-Poor prognosis,palliative care consulted.   DVT prophylaxis:scds  Full code  Disposition:remains in icu

## 2017-07-03 NOTE — PROGRESS NOTES
[facility]  ICU Progress Note        Subjective: Pt. Deteriorating, active herniation, pupils are bilaterally dilated with no light reflex. No corneal reflex. Vital Signs:    Visit Vitals    BP (!) 170/100    Pulse 87    Temp 97.2 °F (36.2 °C)    Resp 16    Ht 5' 5\" (1.651 m)    Wt 45.6 kg (100 lb 8.5 oz)    SpO2 100%    Breastfeeding No    BMI 16.73 kg/m2       O2 Device: Ventilator       Temp (24hrs), Av.6 °F (36.4 °C), Min:96.1 °F (35.6 °C), Max:99.9 °F (37.7 °C)       Intake/Output:   Last shift:         Last 3 shifts:  1901 -  07  In: 3487.2 [I.V.:3337.2]  Out: 8857 [JMGGD:3149; Drains:254]    Intake/Output Summary (Last 24 hours) at 17 0802  Last data filed at 17 0700   Gross per 24 hour   Intake          2777.84 ml   Output             3299 ml   Net          -521.16 ml       Ventilator Settings:  Ventilator  Mode: SIMV, VC+, Pressure support  Respiratory Rate  Back-Up Rate: 16  Insp Time (sec): 1 sec  Insp Flow (l/min): 1 l/min  I:E Ratio: 1:5.67  Ventilator Volumes  Vt Set (ml): 500 ml  Vt Exhaled (Machine Breath) (ml): 507 ml  Vt Spont (ml): 504 ml  Ve Observed (l/min): 8.06 l/min  Ventilator Pressures  Pressure Support (cm H2O): 10 cm H2O  PIP Observed (cm H2O): 19 cm H2O  Plateau Pressure (cm H2O): 17 cm H2O  MAP (cm H2O): 8.7  PEEP/VENT (cm H2O): 5 cm H20  Auto PEEP Observed (cm H2O): 0.5 cm H2O    Physical Exam:    General: No movement on painful stimuli, no spontaneous eye opening.    HEENT:  ETT +  Resp:  Symmetrical chest expansion, no accessory muscle use; good airway entry; no rales/ wheezing/ rhonchi noted  CV:  S1, S2 present; regular rate and rhythm  GI:  Abdomen soft, non-tender; (+) active bowel sounds  Extremities:  +2 pulses on all extremities; no edema/ cyanosis/ clubbing noted  Skin:  Warm; no rashes/ lesions noted  Neurologic:  Pupils are bilaterally dilated + no light reflex, no corneal reflex, no motor activity, unresponsive.      DATA:     Current Facility-Administered Medications   Medication Dose Route Frequency    dextrose 5% and 0.9% NaCl infusion  100 mL/hr IntraVENous CONTINUOUS    hypertonic saline 3 % (NEURO SCIENCE USE ONLY) infusion  0.25-1 mL/kg/hr IntraVENous TITRATE    propofol (DIPRIVAN) infusion  5-50 mcg/kg/min IntraVENous TITRATE    NOREPINephrine (LEVOPHED) 8,000 mcg in dextrose 5% 250 mL infusion  2-16 mcg/min IntraVENous TITRATE    levETIRAcetam (KEPPRA) 500 mg in 0.9% sodium chloride (MBP/ADV) 100 mL MBP  500 mg IntraVENous Q12H    insulin lispro (HUMALOG) injection   SubCUTAneous Q6H    glucose chewable tablet 16 g  4 Tab Oral PRN    glucagon (GLUCAGEN) injection 1 mg  1 mg IntraMUSCular PRN    dextrose (D50W) injection syrg 12.5-25 g  25-50 mL IntraVENous PRN    famotidine (PF) (PEPCID) injection 20 mg  20 mg IntraVENous DAILY    bacitracin 500 unit/gram packet 1 Packet  1 Packet Topical PRN    sodium chloride (NS) flush 10-30 mL  10-30 mL InterCATHeter PRN    sodium chloride (NS) flush 10 mL  10 mL InterCATHeter Q24H    sodium chloride (NS) flush 10 mL  10 mL InterCATHeter PRN    sodium chloride (NS) flush 10-40 mL  10-40 mL InterCATHeter Q8H    sodium chloride (NS) flush 20 mL  20 mL InterCATHeter Q24H    labetalol (NORMODYNE;TRANDATE) 300 mg in 0.9% sodium chloride 150 mL (2 mg / 1 mL) infusion  0.5-2 mg/min IntraVENous TITRATE    ELECTROLYTE REPLACEMENT PROTOCOL  1 Each Other Q8H    ELECTROLYTE REPLACEMENT PROTOCOL  1 Each Other Q8H    ELECTROLYTE REPLACEMENT PROTOCOL  1 Each Other Q8H    PHARMACY INFORMATION NOTE  1 Each Other DAILY    chlorhexidine (PERIDEX) 0.12 % mouthwash 15 mL  15 mL Oral Q12H    ondansetron (ZOFRAN) injection 1 mg  1 mg IntraVENous Q6H PRN    labetalol (NORMODYNE;TRANDATE) 20 mg/4 mL (5 mg/mL) injection 5 mg  5 mg IntraVENous Q15MIN PRN    acetaminophen (TYLENOL) tablet 650 mg  650 mg Oral Q4H PRN    acetaminophen (TYLENOL) suppository 650 mg  650 mg Rectal Q4H PRN    senna-docusate (PERICOLACE) 8.6-50 mg per tablet 2 Tab  2 Tab Oral QHS    bisacodyl (DULCOLAX) tablet 5 mg  5 mg Oral DAILY PRN    bisacodyl (DULCOLAX) suppository 10 mg  10 mg Rectal DAILY PRN    albuterol (PROVENTIL VENTOLIN) nebulizer solution 2.5 mg  2.5 mg Nebulization Q4H PRN    niCARdipine (CARDENE) 125 mg in 0.9% sodium chloride 250 mL infusion  0-15 mg/hr IntraVENous TITRATE    folic acid (FOLVITE) 1 mg, thiamine (B-1) 100 mg in 0.9% sodium chloride 50 mL ivpb   IntraVENous DAILY         Labs: Results:       Chemistry Recent Labs      07/03/17   0330  07/03/17   0108  07/02/17   1922   07/02/17   0259  07/01/17   1900   07/01/17   0318   06/30/17   1520   GLU  141*   --    --    --   153*   --    --   168*   < >  219*   NA  160*  159*  156*   < >  149*   --    --   141   < >  137   K  4.1   --    --    --   4.8  4.2   < >  4.0   < >  2.6*   CL  130*   --    --    --   116*   --    --   104   < >  98*   CO2  21   --    --    --   23   --    --   26   < >  28   BUN  46*   --    --    --   39*   --    --   24*   < >  25*   CREA  2.37*   --    --    --   1.74*   --    --   1.31*   < >  1.28   CA  9.0   --    --    --   8.4*   --    --   8.8   < >  9.2   AGAP  9   --    --    --   10   --    --   11   < >  11   BUCR  19   --    --    --   22*   --    --   18   < >  20   AP  85   --    --    --    --    --    --    --    --   133*   TP  6.6   --    --    --    --    --    --    --    --   8.3*   ALB  3.0*   --    --    --    --    --    --    --    --   4.1   GLOB  3.6   --    --    --    --    --    --    --    --   4.2*   AGRAT  0.8   --    --    --    --    --    --    --    --   1.0    < > = values in this interval not displayed.       CBC w/Diff Recent Labs      07/03/17   0330  07/02/17   0259  07/01/17   0318  06/30/17   1520   WBC  13.7*  13.7*  14.6*  13.6*   RBC  4.76  4.76  5.53*  5.69*   HGB  12.2  12.0  14.3  14.6   HCT  35.8  35.7  41.0  42.2   PLT  235  231 261  268   GRANS   --    --    --   80*   LYMPH   --    --    --   15*   EOS   --    --    --   1      Coagulation Recent Labs      07/03/17   0330  07/02/17   0259   PTP  14.3  13.7   INR  1.2  1.1   APTT  36.6*  35.8       Liver Enzymes Recent Labs      07/03/17   0330   TP  6.6   ALB  3.0*   AP  85   SGOT  44*      ABG Lab Results   Component Value Date/Time    PHI 7.481 (H) 07/02/2017 10:50 AM    PCO2I 28.9 (L) 07/02/2017 10:50 AM    PO2I 159 (H) 07/02/2017 10:50 AM    HCO3I 21.7 (L) 07/02/2017 10:50 AM    FIO2I 30 07/02/2017 10:50 AM      Microbiology Recent Labs      07/02/17   0315  07/01/17   0328  06/30/17   1950   CULT  PENDING  NO GROWTH AFTER 22 HOURS  NO GROWTH 2 DAYS            Impression:    Patient is a 56 y. o. female with PMH cocaine use, HTN, who presented to the ED after pt was found unresponsive on the floor by friends. She was initially brought to [facility], in ED pt was hypertensive and CT head showed L thalamus ICH, pt transferred to [facility] for further evaluation and management. Pt was intubated for airway protection, given mannitol, placed on cardene. UDS +cocaine. WBC 13.6, LA 3.3. There was also a question of possible seizure activity.  Pt had a R EVD placed by Dr. Levar Juares. CT head this am showed increase in L thalamic hematoma with increased IV extension and obstructive hydrocephalus. We were consulted for ventilator management.     Neurogenic respiratory failure: Pt. Without any respiratory effort. Not on sedation. Cont. Ventilator support.     ICH (left thalamus) with intraventricular extension and obstructive hydrocephalus:  CPP goal 60-70. EVD placed, initially set at 10 cm H20 but dropped to 5 yesterday due to increased ICP. This am intracranial pressure is 35 (it had reached 57 yesterday). Dr. Levar Juares aware. Appreciate neurology consult. Patient is hypernatremic and serum osmolality is >340 and hence not a candidate for hyperosmolar therapy or hypertonic saline.  Patient seems to be actively herniating as she is loosing her brain stem reflexes, most likely secondary to cerebral edema. Discussed in detail with the family yesterday. Pt. Is DNR now. [facility] has been informed. Palliative consult.      Acute Renal failure/Hypernatremia: Pt. Has worsening renal failure. Will increase IVF maintenance with D5NS. Pt. Is DNR. Poor prognosis.      Miguel Morse MD

## 2017-07-03 NOTE — PROGRESS NOTES
Speech Therapy Note:  SLP 3rd attempt for swallow evaluation, however patient continues intubated. SLP will discontinue orders. Please reorder when patient is extubated.    Thank you,  Sonya Helms M.S. [facility] SLP  Ph: [phone]  Pager: [phone]

## 2017-07-03 NOTE — PROGRESS NOTES
[facility]: [phone] ([phone]  ILYA KEYES [facility]: [phone]   [facility]: [phone]    Patient Name: Monica Saini  YOB: 1960    Date of Initial Consult: 7-3-17  Reason for Consult: Care Decisions/Support  Requesting Provider: Brandy Jeffries MD   Primary Care Physician: PROVIDER UNKNOWN      SUMMARY:   Monica Saini is a 56y.o. year old with a past history of HTN, Drug Abuse, who was admitted on 6/30/2017 from ER/Home with a diagnosis of found unresponsive. Current medical issues leading to Palliative Medicine involvement include: family at bedside, prognosis is very poor, patient requiring increasing amount of pressors, asked to support family to consider withdrawal of aggressive care. PALLIATIVE DIAGNOSES:   1. AMS  2. Drug Overdose-Cocaine  3. IVH  4. Seizures         PLAN:   1. Rodrigo Traylor and I met with family-patient intubated and unresponsive. Dtr Juan Novoa, son Stephie Samuel, sister Eva Brock were waiting for her other dtr to arrive from [place]. They shared that she was single, had 2 dtrs and one son, 4 grandchildren, her parents had passed and a sister had passed. 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 They said she stayed with friends but had a long history of drug use. 2. AMS-patient has remained unresponsive, was intubated in ER. Family have met with the neurologists and are aware that she is not responsive, that she is not feeling pain either. 3. Drug OD-patient had long standing hx of abuse and they had told this to Dr Jesusa Prader on admission, now they were reticent to share details. 4. IVH-family aware of the bleed inside her brain and the placement of drain to remove this blood that was accumulated in the ventricle. 5. Seizure-some question of seizure activity and nystagmus, was started on KEPPRA and thus far has not had any witnessed seizure activity.   6. Goals of Care and CODE STATUS-family now including dtr Rolla Galeazzi, all agree that she is not going to do well and would like to stop the Pressors and if she passes to allow this without CPR or SHOCKS. Dtr Nawaf Shields signed the [address] They will move forward with COMPASSIONATE EXTUBATION if she does not pass but they are hesitant to do this, as they would like her to  pass without their removing it. Since patient is not responsive will not order a PCA but will place COMFORT ORDERS for prn morphine in case symptoms are perceived. Have included Ativan for agitation/seizures and robinul for secrretions.  has remained with family at the bedside. 7. Initial consult note routed to primary continuity provider  8.  Communicated plan of care with: Palliative IDT       GOALS OF CARE:     [====Goals of Care====]  GOALS OF CARE:  Patient / health care proxy stated goals: to be comfortable      TREATMENT PREFERENCES:   Code Status: DNR    Advance Care Planning:  Advance Care Planning 6/30/2017   Patient's Healthcare Decision Maker is: Legal Next of Christian Hernadez   Primary Decision Maker Name Pop Aragon   Primary Decision Maker Phone Number [phone]   Primary Decision Maker Relationship to Patient Adult child   Secondary Decision Maker Name Silvia Stahl   Secondary Decision Maker Phone Number [phone]   Secondary Decision Maker Relationship to Patient Adult child   Confirm Advance Directive None       Other:    The palliative care team has discussed with patient / health care proxy about goals of care / treatment preferences for patient.  [====Goals of Care====]    Advance Care Planning 6/30/2017   Patient's Healthcare Decision Maker is: Legal Next of Christian 71   Primary Decision Maker Name Pop Aragon   Primary Decision Maker Phone Number [phone]   Primary Decision Maker Relationship to Patient Adult child   Secondary Decision Maker Name Silvia Stahl   Secondary Decision Maker Phone Number [phone]   Secondary Decision Maker Relationship to Patient Adult child Confirm Advance Directive None           HISTORY:     History obtained from: Chart    CHIEF COMPLAINT: AMS    HPI/SUBJECTIVE:  97.9, 124, 259/94->177/93, 16 on vent, sat 100%, 100lb, [address],   MAR_tylenol supp, Nebs, Pepcid, Folvite, Keppra, Pericolace, Levophed, Neosynephrinre   Neuro-Left thalamic hemorrhage due to cocaine use, HTN. Hydrocephalus due to hemorrhage. Ventriculostomy performed. No SZ activity, would not start AED at this time. 7-3    Patient in coma with fixed pupils. Very poor prognosis for recovery. Can't start Brain dead status assessment yet since still have gaga reflex. The patient is:   [] Verbal and participatory  [x] Non-participatory due to: intubation      Clinical Pain Assessment (nonverbal scale for nonverbal patients):     Adult Non-Verbal Pain Assessment    Face  [x] 0   No particular expression or smile  [] 1   Occasional grimace, tearing, frowning, wrinkled forehead  [] 2   Frequent grimace, tearing, frowning, wrinkled forehead    Activity (movement)  [x] 0   Lying quietly, normal position  [] 1   Seeking attention through movement or slow, cautious movement  [] 2   Restless, excessive activity and/or withdrawal reflexes    Guarding  [x] 0   Lying quietly, no positioning of hands over areas of body  [] 1   Splinting areas of the body, tense  [] 2   Rigid, stiff    Physiology (vital signs)  [x] 0   Stable vital signs  [] 1   Change in any of the following: SBP > 20mm Hg; HR > 20/minute  [] 2   Change in any of the following: SBP > 30mm Hg; HR > 25/minute    Respiratory  [x] 0   Baseline RR/SpO2, compliant with ventilator  [] 1   RR > 10 above baseline, or 5% drop SpO2, mild asynchrony with ventilator  [] 2   RR > 20 above baseline, or 10% drop SpO2, asynchrony with ventilator    Total Non-Verbal Pain Score: 0       FUNCTIONAL ASSESSMENT:     Palliative Performance Scale (PPS):10%          PSYCHOSOCIAL/SPIRITUAL SCREENING:     Advance Care Planning:  Advance Care Planning 6/30/2017   Patient's Healthcare Decision Maker is: Legal Next of Christian 71   Primary Decision Maker Name Meet Colon   Primary Decision Maker Phone Number [phone]   Primary Decision Maker Relationship to Patient Adult child   Secondary Decision Maker Name Winston Oleary   Secondary Decision Maker Phone Number [phone]   Secondary Decision Maker Relationship to Patient Adult child   Confirm Advance Directive None        Any spiritual / Advent concerns:  [] Yes /  [x] No    Caregiver Burnout:  [] Yes /  [x] No /  [] No Caregiver Present      Anticipatory grief assessment:   [x] Normal  / [] Maladaptive       ESAS Anxiety:      ESAS Depression:          REVIEW OF SYSTEMS:     Positive and pertinent negative findings in ROS are noted above in HPI. The following systems were [] reviewed / [x] unable to be reviewed as noted in HPI  Other findings are noted below. Systems: constitutional, ears/nose/mouth/throat, respiratory, gastrointestinal, genitourinary, musculoskeletal, integumentary, neurologic, psychiatric, endocrine. Positive findings noted below. Modified ESAS Completed by: provider                                            PHYSICAL EXAM:     Wt Readings from Last 3 Encounters:   07/03/17 45.6 kg (100 lb 8.5 oz)     Blood pressure (!) 196/100, pulse 78, temperature 96.4 °F (35.8 °C), resp. rate 16, height 5' 5\" (1.651 m), weight 45.6 kg (100 lb 8.5 oz), SpO2 100 %, not currently breastfeeding.   Pain:  Pain Scale 1: Adult Nonverbal Pain Scale  Pain Intensity 1: 0                 Last bowel movement: no stool    Constitutional: unresponsive, does not open eyes, intubated         HISTORY:     Principal Problem:    ICH (intracerebral hemorrhage) ([address] (6/30/2017)    Active Problems:    IVH (intraventricular hemorrhage) ([address] (6/30/2017)      Malignant hypertension requiring acute intensive management (6/30/2017)      Hydrocephalus (6/30/2017)      Cerebral edema (HCC) (6/30/2017)      Brain compression ([address] (6/30/2017)      Respiratory center failure (6/30/2017)      Cocaine abuse (6/30/2017)      EVD right frontal (6/30/2017)      LCFV CVL (6/30/2017)      Past Medical History:   Diagnosis Date    Crack cocaine use     Hypertension       Past Surgical History:   Procedure Laterality Date    HX CSF SHUNT Right 06/30/2017    EVD right frontal      History reviewed. No pertinent family history. History reviewed, no pertinent family history.   Social History   Substance Use Topics    Smoking status: Current Every Day Smoker     Types: Cigarettes    Smokeless tobacco: Never Used    Alcohol use No     No Known Allergies   Current Facility-Administered Medications   Medication Dose Route Frequency    dextrose 5% infusion  100 mL/hr IntraVENous CONTINUOUS    hypertonic saline 3 % (NEURO SCIENCE USE ONLY) infusion  0.25-1 mL/kg/hr IntraVENous TITRATE    propofol (DIPRIVAN) infusion  5-50 mcg/kg/min IntraVENous TITRATE    NOREPINephrine (LEVOPHED) 8,000 mcg in dextrose 5% 250 mL infusion  2-16 mcg/min IntraVENous TITRATE    levETIRAcetam (KEPPRA) 500 mg in 0.9% sodium chloride (MBP/ADV) 100 mL MBP  500 mg IntraVENous Q12H    insulin lispro (HUMALOG) injection   SubCUTAneous Q6H    glucose chewable tablet 16 g  4 Tab Oral PRN    glucagon (GLUCAGEN) injection 1 mg  1 mg IntraMUSCular PRN    dextrose (D50W) injection syrg 12.5-25 g  25-50 mL IntraVENous PRN    famotidine (PF) (PEPCID) injection 20 mg  20 mg IntraVENous DAILY    bacitracin 500 unit/gram packet 1 Packet  1 Packet Topical PRN    sodium chloride (NS) flush 10-30 mL  10-30 mL InterCATHeter PRN    sodium chloride (NS) flush 10 mL  10 mL InterCATHeter Q24H    sodium chloride (NS) flush 10 mL  10 mL InterCATHeter PRN    sodium chloride (NS) flush 10-40 mL  10-40 mL InterCATHeter Q8H    sodium chloride (NS) flush 20 mL  20 mL InterCATHeter Q24H    labetalol (NORMODYNE;TRANDATE) 300 mg in 0.9% sodium chloride 150 mL (2 mg / 1 mL) infusion  0.5-2 mg/min IntraVENous TITRATE    ELECTROLYTE REPLACEMENT PROTOCOL  1 Each Other Q8H    ELECTROLYTE REPLACEMENT PROTOCOL  1 Each Other Q8H    ELECTROLYTE REPLACEMENT PROTOCOL  1 Each Other Q8H    PHARMACY INFORMATION NOTE  1 Each Other DAILY    chlorhexidine (PERIDEX) 0.12 % mouthwash 15 mL  15 mL Oral Q12H    ondansetron (ZOFRAN) injection 1 mg  1 mg IntraVENous Q6H PRN    labetalol (NORMODYNE;TRANDATE) 20 mg/4 mL (5 mg/mL) injection 5 mg  5 mg IntraVENous Q15MIN PRN    acetaminophen (TYLENOL) tablet 650 mg  650 mg Oral Q4H PRN    acetaminophen (TYLENOL) suppository 650 mg  650 mg Rectal Q4H PRN    senna-docusate (PERICOLACE) 8.6-50 mg per tablet 2 Tab  2 Tab Oral QHS    bisacodyl (DULCOLAX) tablet 5 mg  5 mg Oral DAILY PRN    bisacodyl (DULCOLAX) suppository 10 mg  10 mg Rectal DAILY PRN    albuterol (PROVENTIL VENTOLIN) nebulizer solution 2.5 mg  2.5 mg Nebulization Q4H PRN    niCARdipine (CARDENE) 125 mg in 0.9% sodium chloride 250 mL infusion  0-15 mg/hr IntraVENous TITRATE    folic acid (FOLVITE) 1 mg, thiamine (B-1) 100 mg in 0.9% sodium chloride 50 mL ivpb   IntraVENous DAILY        LAB AND IMAGING FINDINGS:     Lab Results   Component Value Date/Time    WBC 13.7 07/03/2017 03:30 AM    HGB 12.2 07/03/2017 03:30 AM    PLATELET 382 80/59/5711 03:30 AM     Lab Results   Component Value Date/Time    Sodium 160 07/03/2017 03:30 AM    Potassium 4.1 07/03/2017 03:30 AM    Chloride 130 07/03/2017 03:30 AM    CO2 21 07/03/2017 03:30 AM    BUN 46 07/03/2017 03:30 AM    Creatinine 2.37 07/03/2017 03:30 AM    Calcium 9.0 07/03/2017 03:30 AM    Magnesium 2.7 07/03/2017 03:30 AM    Phosphorus 2.8 07/03/2017 03:30 AM      Lab Results   Component Value Date/Time    AST (SGOT) 44 07/03/2017 03:30 AM    Alk.  phosphatase 85 07/03/2017 03:30 AM    Protein, total 6.6 07/03/2017 03:30 AM    Albumin 3.0 07/03/2017 03:30 AM    Globulin 3.6 07/03/2017 03:30 AM     Lab Results   Component Value Date/Time    INR 1.2 07/03/2017 03:30 AM    Prothrombin time 14.3 07/03/2017 03:30 AM    aPTT 36.6 07/03/2017 03:30 AM      No results found for: IRON, FE, TIBC, IBCT, PSAT, FERR   No results found for: PH, PCO2, PO2  No components found for: [place]   Lab Results   Component Value Date/Time     07/02/2017 07:22 PM    CK - MB 4.8 07/02/2017 07:22 PM              Total time: 110  Counseling / coordination time, spent as noted above: 75  > 50% counseling / coordination?: yes with family    Prolonged service was provided for  [x]30 min   []75 min in face to face time in the presence of the patient, spent as noted above. Time Start: 4:00pm  Time End: 5:15pm  Note: this can only be billed with  (initial) or 21 [phone] (follow up). If multiple start / stop times, list each separately.

## 2017-07-03 NOTE — PROGRESS NOTES
responded to Death of  Luisa De La Rosa, who was a 56 y.o.,female,     The  provided the following Interventions for Patient Family:  Provided crisis pastoral care, pastoral support and grief interventions. Offered prayers on behalf of the patient family. Chart reviewed. Plan:  Chaplains will continue to follow and will provide pastoral care on an as needed/requested basis and grief support for the family. Breanne Vences M.Div.   [facility] [address]  [phone]

## 2017-07-04 LAB — METHADONE SERPL-MCNC: NORMAL NG/ML (ref 100–400)

## 2017-07-04 NOTE — PROGRESS NOTES
[facility] called to defer accepting the patient at this time.   [place] [facility] [place] Kat Arita RN and TOBI

## 2017-07-06 LAB
BACTERIA SPEC CULT: NORMAL
BACTERIA SPEC CULT: NORMAL
GRAM STN SPEC: NORMAL
GRAM STN SPEC: NORMAL
SERVICE CMNT-IMP: NORMAL
SERVICE CMNT-IMP: NORMAL

## 2017-07-07 LAB
BACTERIA SPEC CULT: NORMAL
GRAM STN SPEC: NORMAL
GRAM STN SPEC: NORMAL
SERVICE CMNT-IMP: NORMAL

## 2017-07-08 LAB
BACTERIA SPEC CULT: NORMAL
GRAM STN SPEC: NORMAL
GRAM STN SPEC: NORMAL
SERVICE CMNT-IMP: NORMAL

## 2017-07-16 NOTE — DISCHARGE SUMMARY
Discharge Summary    Patient: Vivien Carreon               Sex: female          DOA: 2017         YOB: 1960      Age:  56 y.o.        LOS:  LOS: 3 days                Admit Date: 2017    Discharge Date: 2017    Admission Diagnoses: ICH (intracerebral hemorrhage) ([facility]    Discharge Diagnoses:    Problem List as of 7/3/2017  Date Reviewed: 2017          Codes Class Noted - Resolved    Altered mental status ICD-10-CM: R41.82  ICD-9-CM: 780.97  7/3/2017 - Present        Seizures ([facility] ICD-10-CM: R56.9  ICD-9-CM: 780.39  7/3/2017 - Present        * (Principal)ICH (intracerebral hemorrhage) ([facility] ICD-10-CM: I61.9  ICD-9-CM: 310  2017 - Present        IVH (intraventricular hemorrhage) ([facility] ICD-10-CM: I61.5  ICD-9-CM: 409  2017 - Present        Malignant hypertension requiring acute intensive management ICD-10-CM: I10  ICD-9-CM: 401.0  2017 - Present        Hydrocephalus ICD-10-CM: G91.9  ICD-9-CM: 331.4  2017 - Present        Cerebral edema ([facility] ICD-10-CM: G93.6  ICD-9-CM: 348.5  2017 - Present        Brain compression ([facility] ICD-10-CM: G93.5  ICD-9-CM: 348.4  2017 - Present        Respiratory center failure ICD-10-CM: G93.89  ICD-9-CM: 348.89  2017 - Present        Cocaine abuse ICD-10-CM: F14.10  ICD-9-CM: 305.60  2017 - Present        EVD right frontal ICD-10-CM: Z98.2  ICD-9-CM: V45.2  2017 - Present        LCFV CVL ICD-10-CM: Z78.9  ICD-9-CM: V49.89  2017 - Present        RESOLVED: Hypokalemia ICD-10-CM: E87.6  ICD-9-CM: 276.8  2017 - 2017              Discharge Medications:   Cannot display discharge medications since this patient is not currently admitted. Follow-up:pt     Labs:  Labs: Results:       Chemistry No results for input(s): GLU, NA, K, CL, CO2, BUN, CREA, CA, AGAP, BUCR, TBIL, GPT, AP, TP, ALB, GLOB, AGRAT in the last 72 hours.    CBC w/Diff No results for input(s): WBC, RBC, HGB, HCT, PLT, GRANS, LYMPH, EOS, HGBEXT, HCTEXT, PLTEXT in the last 72 hours. Cardiac Enzymes No results for input(s): CPK, CKND1, KELECHI in the last 72 hours. No lab exists for component: CKRMB, TROIP   Coagulation No results for input(s): PTP, INR, APTT in the last 72 hours. No lab exists for component: INREXT    Lipid Panel Lab Results   Component Value Date/Time    Cholesterol, total 286 06/30/2017 06:30 PM    HDL Cholesterol 87 06/30/2017 06:30 PM    LDL, calculated 180.8 06/30/2017 06:30 PM    VLDL, calculated 18.2 06/30/2017 06:30 PM    Triglyceride 91 06/30/2017 06:30 PM    CHOL/HDL Ratio 3.3 06/30/2017 06:30 PM      BNP No results for input(s): BNPP in the last 72 hours. Liver Enzymes No results for input(s): TP, ALB, TBIL, AP, SGOT, GPT in the last 72 hours. No lab exists for component: DBIL   Thyroid Studies Lab Results   Component Value Date/Time    TSH 0.32 07/01/2017 05:10 PM          Imaging:  CT head on 7/1  1. Interval increased size of left basal ganglia and thalamic hematoma since  initial presenting CT, with increased intraventricular extension, increased  findings of obstructive hydrocephalus, with increased midline shift to the  right. These worsening findings were noted in the patient's chart by Dr. Basil Harley.     2. Right frontal ventriculostomy catheter in place with small extra-axial  pneumocephalus. Consults:   neurovascular    Treatment Team: Treatment Team: Attending Provider: Alie Damon MD; Consulting Provider: Zoran Cano MD; Utilization Review: Philip Tellez RN; Consulting Provider: Alysia Bond MD; Physician Assistant: CHRISTIANE Vasquez; Consulting Provider: Watson Dumont MD    Significant Diagnostic Studies:see recent lab results    Hospital Course:  Patient with admitted for [address] left thalamus(32 mm x 30 mm x 22 mm). The blood extended into the third ventricle resulting in blood in the left more than right lateral ventricles which are enlarged, aqueduct, and fourth ventricle. Patient had ventriculostomy with evd right frontal.Patient was intubated and unresponsive. Patient was followed ,general neurology and critical care. Prognosis was poor and patient did not breath anymore while on vent. The case was discussed with family by critical care team and comfort care was opted. Pt was extubated,then shortly pronounced dead.     Christopher Sorensen MD  July 16, 2017

## 2019-05-30 NOTE — ANESTHESIA POSTPROCEDURE EVALUATION
* No procedures listed *.    general    Anesthesia Post Evaluation      Multimodal analgesia: multimodal analgesia used between 6 hours prior to anesthesia start to PACU discharge  Patient location during evaluation: ICU        No vitals data found for the desired time range.
